# Patient Record
Sex: MALE | Race: WHITE
[De-identification: names, ages, dates, MRNs, and addresses within clinical notes are randomized per-mention and may not be internally consistent; named-entity substitution may affect disease eponyms.]

---

## 2020-08-27 ENCOUNTER — HOSPITAL ENCOUNTER (INPATIENT)
Dept: HOSPITAL 74 - JER | Age: 70
LOS: 1 days | Discharge: ANOTHER HOSPITAL | DRG: 439 | End: 2020-08-28
Attending: GENERAL ACUTE CARE HOSPITAL | Admitting: INTERNAL MEDICINE
Payer: COMMERCIAL

## 2020-08-27 VITALS — BODY MASS INDEX: 25.2 KG/M2

## 2020-08-27 DIAGNOSIS — E11.9: ICD-10-CM

## 2020-08-27 DIAGNOSIS — K86.1: ICD-10-CM

## 2020-08-27 DIAGNOSIS — N40.0: ICD-10-CM

## 2020-08-27 DIAGNOSIS — E78.5: ICD-10-CM

## 2020-08-27 DIAGNOSIS — Z85.038: ICD-10-CM

## 2020-08-27 DIAGNOSIS — Z88.1: ICD-10-CM

## 2020-08-27 DIAGNOSIS — E83.42: ICD-10-CM

## 2020-08-27 DIAGNOSIS — K85.90: Primary | ICD-10-CM

## 2020-08-27 DIAGNOSIS — N18.9: ICD-10-CM

## 2020-08-27 DIAGNOSIS — D50.9: ICD-10-CM

## 2020-08-27 DIAGNOSIS — D63.8: ICD-10-CM

## 2020-08-27 DIAGNOSIS — D64.9: ICD-10-CM

## 2020-08-27 DIAGNOSIS — E86.0: ICD-10-CM

## 2020-08-27 DIAGNOSIS — E11.22: ICD-10-CM

## 2020-08-27 DIAGNOSIS — N17.9: ICD-10-CM

## 2020-08-27 DIAGNOSIS — E46: ICD-10-CM

## 2020-08-27 DIAGNOSIS — K86.3: ICD-10-CM

## 2020-08-27 LAB
ALBUMIN SERPL-MCNC: 3.1 G/DL (ref 3.4–5)
ALP SERPL-CCNC: 160 U/L (ref 45–117)
ALT SERPL-CCNC: 49 U/L (ref 13–61)
ANION GAP SERPL CALC-SCNC: 6 MMOL/L (ref 8–16)
APTT BLD: 27.6 SECONDS (ref 25.2–36.5)
AST SERPL-CCNC: 57 U/L (ref 15–37)
BASOPHILS # BLD: 0.7 % (ref 0–2)
BILIRUB SERPL-MCNC: 0.8 MG/DL (ref 0.2–1)
BUN SERPL-MCNC: 22.5 MG/DL (ref 7–18)
CALCIUM SERPL-MCNC: 9.2 MG/DL (ref 8.5–10.1)
CHLORIDE SERPL-SCNC: 104 MMOL/L (ref 98–107)
CO2 SERPL-SCNC: 24 MMOL/L (ref 21–32)
CREAT SERPL-MCNC: 1.6 MG/DL (ref 0.55–1.3)
DEPRECATED RDW RBC AUTO: 18.7 % (ref 11.9–15.9)
EOSINOPHIL # BLD: 4.5 % (ref 0–4.5)
GLUCOSE SERPL-MCNC: 128 MG/DL (ref 74–106)
HCT VFR BLD CALC: 30.8 % (ref 35.4–49)
HGB BLD-MCNC: 9.9 GM/DL (ref 11.7–16.9)
INR BLD: 1.32 (ref 0.83–1.09)
LYMPHOCYTES # BLD: 7.3 % (ref 8–40)
MCH RBC QN AUTO: 26.8 PG (ref 25.7–33.7)
MCHC RBC AUTO-ENTMCNC: 32 G/DL (ref 32–35.9)
MCV RBC: 83.7 FL (ref 80–96)
MONOCYTES # BLD AUTO: 13.8 % (ref 3.8–10.2)
NEUTROPHILS # BLD: 73.7 % (ref 42.8–82.8)
PLATELET # BLD AUTO: 238 K/MM3 (ref 134–434)
PMV BLD: 10.2 FL (ref 7.5–11.1)
POTASSIUM SERPLBLD-SCNC: 4.9 MMOL/L (ref 3.5–5.1)
PROT SERPL-MCNC: 7.9 G/DL (ref 6.4–8.2)
PT PNL PPP: 15.6 SEC (ref 9.7–13)
RBC # BLD AUTO: 3.68 M/MM3 (ref 4–5.6)
SODIUM SERPL-SCNC: 135 MMOL/L (ref 136–145)
TRIGL SERPL-MCNC: 115 MG/DL (ref 0–150)
WBC # BLD AUTO: 7.2 K/MM3 (ref 4–10)

## 2020-08-27 PROCEDURE — U0003 INFECTIOUS AGENT DETECTION BY NUCLEIC ACID (DNA OR RNA); SEVERE ACUTE RESPIRATORY SYNDROME CORONAVIRUS 2 (SARS-COV-2) (CORONAVIRUS DISEASE [COVID-19]), AMPLIFIED PROBE TECHNIQUE, MAKING USE OF HIGH THROUGHPUT TECHNOLOGIES AS DESCRIBED BY CMS-2020-01-R: HCPCS

## 2020-08-27 RX ADMIN — SODIUM CHLORIDE, POTASSIUM CHLORIDE, SODIUM LACTATE AND CALCIUM CHLORIDE SCH MLS/HR: 600; 310; 30; 20 INJECTION, SOLUTION INTRAVENOUS at 21:23

## 2020-08-27 RX ADMIN — INSULIN ASPART SCH: 100 INJECTION, SOLUTION INTRAVENOUS; SUBCUTANEOUS at 21:39

## 2020-08-27 NOTE — HP
CHIEF COMPLAINT: 2 weeks worsening epigastric pain





PCP: Dr. Talley (patient reports last visit maybe 5 years ago; does not see 

doctor unless sick due to financial constraints)





HISTORY OF PRESENT ILLNESS:


69 year old male patient with past medical history that includes HLD, NIDDM, 

colo-rectal cancer resection with ileostomy and chemo and radiation, 

cholecystectomy, and hiatial hernia, who presented to the emergency room with 2 

weeks of worsening epigastric pain.  The patient's pain began at around a 4/10 

and then gradually increased daily until it was a 10/10 for the past 2 days and 

the patient was unable to sleep.  The pain radiates to the right and left upper 

quadrants of his abdomen.  The patient reports feeling very hungry and with an 

appetite, but he has not been eating as he feels that it might make the pain 

worse.  He has been drinking several quarts of fluids daily.  The patient denies

any nausea/vomiting.  He reports that his stool in his ileostomy bag hasn't 

changed in any way.  He changes his ileostomy bag 6 times a day.  The patient 

denies drinking any alcohol in the past month.  He stopped drinking in March of 

this year and only drinks about one 6 pack of alcohol monthly, with his last 

drink 1 month ago.  The patient denies any recent illness.  The patient reports 

that his pain is down from a 10/10 to a 4/10 with the pain medication he was 

given in the emergency room.





ER course was notable for:


(1) 2 liters Lactated Ringers


(2) 60mg Fentanyl and 1,000mg IV Tylenol


(3) CT A/P





Recent Travel: denies





PAST MEDICAL HISTORY:


HLD, NIDDM, colo-rectal cancer resection with ileostomy and chemo and radiation,

hiatial hernia





PAST SURGICAL HISTORY:


parastomal hernia repair 11/2013, I&D parastomal abscess 12/6/2013, [facility] 

laser prostatectomy 5/4/2016, right lower quadrant ileostomy 7/6/2012, colo-

rectal cancer resection 2002, cholecystectomy around 1998





Social History:


Smoking: Denies


Alcohol: Denies.  Drinks one 6-pack of beer monthly.  Last drink 1 month ago.


Drugs: Denies





Allergies





Penicillins Allergy (Severe, Verified 03/11/20 18:52)


   Difficulty Breathing


   anaphylaxis 


adhesive tape Adverse Reaction (Verified 03/10/20 05:53)


   Rash


hydromorphone HCl [From Dilaudid] Adverse Reaction (Verified 03/10/20 05:53)


   halucinations


   pt halucinates with IV dilaudid only. pt takes po dilaudid daily without any 

   reaction 








HOME MEDICATIONS:


                                Home Medications











 Medication  Instructions  Recorded


 


Gabapentin [Neurontin -] 300 mg PO TID 03/09/20


 


Pantoprazole Sodium [Protonix -] 40 mg PO DAILY 03/09/20


 


metFORMIN HCL [Metformin HCl ER] 750 mg PO DAILY 03/09/20


 


Aspirin [ASA -] 81 mg PO DAILY #30 tab.chew 03/16/20


 


Albuterol Sulfate [Proair Hfa] 2 puff IH BID PRN #1 inhaler 03/24/20


 


Metoprolol Tartrate [Lopressor -] 12.5 mg PO BID #60 tablet 03/24/20








REVIEW OF SYSTEMS


CONSTITUTIONAL: 


Absent:  no loss of appetite


CARDIOVASCULAR: 


Absent: no chest pain


RESPIRATORY: 


Absent: no shortness of breath


GASTROINTESTINAL: abdominal pan


Absent: no nausea, no vomiting, no change in his ileostomy output


GENITOURINARY: 


Absent: no dysuria


MUSCULOSKELETAL: 


Absent: no leg swelling








PHYSICAL EXAMINATION


                               Vital Signs - 24 hr











  08/27/20 08/27/20 08/27/20





  12:15 14:53 17:52


 


Temperature 98.4 F  


 


Pulse Rate 133 H  


 


Pulse Rate [  98 H 98 H





Left]   


 


Respiratory 21 H 16 16





Rate   


 


Blood Pressure 115/74  


 


Blood Pressure  135/82 130/71





[Arm]   


 


O2 Sat by Pulse 98 99 99





Oximetry (%)   














  08/27/20





  18:17


 


Temperature 


 


Pulse Rate 


 


Pulse Rate [ 





Left] 


 


Respiratory 





Rate 


 


Blood Pressure 


 


Blood Pressure 





[Arm] 


 


O2 Sat by Pulse 99





Oximetry (%) 











GENERAL: Awake, alert, and fully oriented, in no acute distress.


HEAD: Normal with no signs of trauma.


EYES: Pupils equal, round and reactive to light, extraocular movements intact. 

No lid lag.


EARS, NOSE, THROAT: Ears normal, nares patent, oropharynx clear without 

exudates. Moist mucous membranes.


NECK: Normal range of motion, supple without lymphadenopathy, JVD, or masses.


LUNGS: Breath sounds equal, clear to auscultation bilaterally. No wheezes, and 

no crackles. No accessory muscle use.


HEART: Regular rate and rhythm, normal S1 and S2 without murmur, rub or gallop.


ABDOMEN: Soft, tender to epigastric region and tender to RUQ and LUQ of abdomen,

 not distended, normoactive bowel sounds, no guarding, no rebound, no masses. 

Umbilicus dark blue-andi, but patient reports that this is it's normal 

appearance.  No erythema around ileostomy bag.


MUSCULOSKELETAL: Normal range of motion at all joints. No bony deformities or 

tenderness.


UPPER EXTREMITIES: 2+ pulses, warm, well-perfused. No cyanosis. No clubbing. No 

peripheral edema.


LOWER EXTREMITIES: 2+ pulses, warm, well-perfused. No calf tenderness. No 

peripheral edema. 


NEUROLOGICAL:  Normal speech.  Unable to see laterally to the left and 

inferiorly on visual field testing.


PSYCHIATRIC: Cooperative. Good eye contact. Appropriate mood and affect.


SKIN: Warm, dry, normal turgor, no rashes or lesions noted, normal capillary 

refill. 


                         Laboratory Results - last 24 hr











  08/27/20 08/27/20 08/27/20





  13:40 14:40 14:40


 


WBC   7.2 


 


RBC   3.68 L 


 


Hgb   9.9 L 


 


Hct   30.8 L D 


 


MCV   83.7 


 


MCH   26.8 


 


MCHC   32.0 


 


RDW   18.7 H 


 


Plt Count   238  D 


 


MPV   10.2 


 


Absolute Neuts (auto)   5.3 


 


Neutrophils %   73.7  D 


 


Lymphocytes %   7.3 L D 


 


Monocytes %   13.8 H 


 


Eosinophils %   4.5 


 


Basophils %   0.7 


 


Nucleated RBC %   0 


 


PT with INR    15.60 H


 


INR    1.32 H


 


PTT (Actin FS)    27.6


 


Sodium   


 


Potassium   


 


Chloride   


 


Carbon Dioxide   


 


Anion Gap   


 


BUN   


 


Creatinine   


 


Est GFR (CKD-EPI)AfAm   


 


Est GFR (CKD-EPI)NonAf   


 


Random Glucose   


 


Lactic Acid   


 


Calcium   


 


Total Bilirubin   


 


AST   


 


ALT   


 


Alkaline Phosphatase   


 


Total Protein   


 


Albumin   


 


Triglycerides   


 


Lipase   


 


Blood Type  O POSITIVE  


 


Antibody Screen  Negative  














  08/27/20 08/27/20 08/27/20





  14:40 14:40 18:00


 


WBC   


 


RBC   


 


Hgb   


 


Hct   


 


MCV   


 


MCH   


 


MCHC   


 


RDW   


 


Plt Count   


 


MPV   


 


Absolute Neuts (auto)   


 


Neutrophils %   


 


Lymphocytes %   


 


Monocytes %   


 


Eosinophils %   


 


Basophils %   


 


Nucleated RBC %   


 


PT with INR   


 


INR   


 


PTT (Actin FS)   


 


Sodium  135 L  


 


Potassium  4.9  


 


Chloride  104  


 


Carbon Dioxide  24  


 


Anion Gap  6 L  


 


BUN  22.5 H  


 


Creatinine  1.6 H  


 


Est GFR (CKD-EPI)AfAm  50.20  


 


Est GFR (CKD-EPI)NonAf  43.31  


 


Random Glucose  128 H  


 


Lactic Acid   2.2 H*  2.0


 


Calcium  9.2  


 


Total Bilirubin  0.8  


 


AST  57 H  


 


ALT  49  


 


Alkaline Phosphatase  160 H  


 


Total Protein  7.9  


 


Albumin  3.1 L  


 


Triglycerides  115  


 


Lipase  2732 H  


 


Blood Type   


 


Antibody Screen   











ASSESSMENT/PLAN:


69 year old male patient with past medical history that includes HLD, NIDDM, 

colo-rectal cancer resection with ileostomy and chemo and radiation, 

cholecystectomy, and hiatial hernia, who presented to the emergency room with 2 

weeks of worsening epigastric pain.





1. Pancreatitis of idiopathic etiology


- CT A/P showed pseudocysts


- Lipase 2,732


- Patient denies recent alcohol use


- Past medical history of cholecystectomy


- LR at 200


- Oxycodone CHRISTINE and Fentanyl PRN for pain


- NPO with plan to advance diet likely tomorrow morning





2. Possible WESLEY


- Nephro consulted


- Renal U/S


- IV Fluids





3. NIDDM


- Novolog Sliding Scale


- Blood Glucose Monitoring


- Consider starting on ACEI before discharge after possible WESLEY is addressed.





4. Anemia


- Hem Consulted


- Iron study done on 3/31/2020


- FOBT (patient's rectum sewn shut and instead uses ileostomy bag)


- Venofer for the morning





5. Hypoalbuminemia


- Dietician consulted





# FEN - LR at 200, Monitoring Electrolytes, NPO





DVT PPx - Lovenox SQ











Family Medical History


Family History: As Documented


Family Hx Cancer: Mother


Family Hx Coronary Artery Disease: Father





Visit type





- Medication Review


Med list reviewed for High Risk Meds patients 65 and older: Yes





- Emergency Visit


Emergency Visit: Yes


ED Registration Date: 08/27/20


Care time: The patient presented to the Emergency Department on the above date 

and was hospitalized for further evaluation of their emergent condition.





- New Patient


This patient is new to me today: Yes


Date on this admission: 08/28/20





- Critical Care


Critical Care patient: No





ATTENDING PHYSICIAN STATEMENT





I saw and evaluated the patient.


I reviewed the resident's note and discussed the case with the resident.


I agree with the resident's findings and plan as documented.








SUBJECTIVE:








OBJECTIVE:








ASSESSMENT AND PLAN:

## 2020-08-27 NOTE — PN
Teaching Attending Note


Name of Resident: Hunter Helm





ATTENDING PHYSICIAN STATEMENT





I saw and evaluated the patient.


I reviewed the resident's note and discussed the case with the resident.


I agree with the resident's findings and plan as documented.








SUBJECTIVE:


Patient is a 69 year old man with a PMH of Penicillin/Hydromorphone allergy, HLD

, NIDDM, ?Alcohol abuse, Colon cancer (s/p resection/ileostomy; 

chemotherapy/radiotherapy), Pancreatitis, ?CKD and ?COPD who presents with 

worsening epigastric pain for 2 weeks. Over the last two days, patient has had 

decreased PO intake and difficulty sleeping due to pain. Pain is described as 

epigastric, radiating bilaterally to upper abdomen, constant, 11/10, worse when 

moving and laying on right side and described as squeezing. Was prescribed 21 

tabs of Oxycodone by Dr. Talley, and states that taking 1 pill at a time had

no effect. 





States that he had Pancreatitis in March when he was admitted here (?Bactrim-

induced pancreatitis), and states that current pain is much worse. Reports 

significant weight loss prior to last admission, but denies recent further 

weight loss. No change in stool output in ileostomy bag. Patient denies chest 

pain, shortness of breath, headache, palpitations, dizziness, fever, chills, 

nausea, vomiting, diarrhea, constipation, dysuria, frequency, urgency, melena, 

hematochezia or hematuria. Former smoker. Denies alcohol, tobacco or illicit 

drug use. No sick contacts or recent travels. Family history - father  of MI

and mother  of unspecified cancer.  





OBJECTIVE:


Alert


                                   Vital Signs











 Period  Temp  Pulse  Resp  BP Sys/Kothari  Pulse Ox


 


 Last 24 Hr  98.4 F    16-21  115-135/71-82  98-99








HEENT: No Jaundice, eye redness or discharge, PERRLA, EOMI. Normocephalic, 

atraumatic. External ears are normal and hearing is grossly intact. No nasal 

discharge.


Neck: Supple, nontender. No palpable adenopathy or thyromegaly. No JVD


Chest: Good effort. Clear to auscultation and percussion.


Heart: Regular. No S3, rub or murmur


Abdomen: Not distended, soft, diffuse tenderness; ileostomy bag in place; and no

HSM. No rebound or guarding. Normal bowel sounds.


Ext: Peripheral pulses intact. No leg edema.


Skin: Warm and dry. No petechiae, rash or ecchymosis.


Neuro: Alert. Oriented x3. CN 2-12 grossly intact. Sensation grossly intact in 

all four extremities and DTR are symmetric.


Psych: Appropriate mood and affect. Good insight.


                                Home Medications











 Medication  Instructions  Recorded


 


Gabapentin [Neurontin -] 300 mg PO TID 20


 


Pantoprazole Sodium [Protonix -] 40 mg PO DAILY 20


 


metFORMIN HCL [Metformin HCl ER] 750 mg PO DAILY 20


 


Aspirin [ASA -] 81 mg PO DAILY #30 tab.chew 20


 


Albuterol Sulfate [Proair Hfa] 2 puff IH BID PRN #1 inhaler 20


 


Metoprolol Tartrate [Lopressor -] 12.5 mg PO BID #60 tablet 20








                              Abnormal Lab Results











  20





  14:40 14:40 14:40


 


RBC  3.68 L  


 


Hgb  9.9 L  


 


Hct  30.8 L D  


 


RDW  18.7 H  


 


Lymphocytes %  7.3 L D  


 


Monocytes %  13.8 H  


 


PT with INR   15.60 H 


 


INR   1.32 H 


 


Sodium    135 L


 


Anion Gap    6 L


 


BUN    22.5 H


 


Creatinine    1.6 H


 


Random Glucose    128 H


 


Lactic Acid   


 


AST    57 H


 


Alkaline Phosphatase    160 H


 


Albumin    3.1 L


 


Lipase    2732 H














  20





  14:40


 


RBC 


 


Hgb 


 


Hct 


 


RDW 


 


Lymphocytes % 


 


Monocytes % 


 


PT with INR 


 


INR 


 


Sodium 


 


Anion Gap 


 


BUN 


 


Creatinine 


 


Random Glucose 


 


Lactic Acid  2.2 H*


 


AST 


 


Alkaline Phosphatase 


 


Albumin 


 


Lipase 








                               Current Medications











Generic Name Dose Route Start Last Admin





  Trade Name Freq  PRN Reason Stop Dose Admin


 


Enoxaparin Sodium  40 mg  20 10:00 





  Lovenox -  SQ  





  DAILY CHRISTINE  


 


Fentanyl  25 mcg  20 21:18 





  Sublimaze Injection -  IVPUSH  20 21:29 





  Q6H PRN  





  PAIN LEVEL 7 - 10  


 


Lactated Ringer's  1,000 ml in 1,000 mls @ 200 mls/hr  20 21:15  20 

21:23





  Lactated Ringers Solution  IV   200 mls/hr





  ASDIR CHRISTINE   Administration


 


Insulin Aspart  1 vial  20 22:00  08/27/20 21:39





  Novolog Vial Sliding Scale -  SQ   Not Given





  [facility]  





  Protocol  


 


Oxycodone HCl  5 mg  20 22:00 





  Roxicodone -  PO  





  TID [facility]  








ASSESSMENT AND PLAN:


1. Pancreatitis - CT scan of abdomen/pelvis with IV contrast shows "multiple 

peripancreatic cysts consistent with pseudocysts, additional cysts anterior to 

transverse colon consistent with pseudocysts, thick walled urinary bladder." ER 

staff prescribed Tylenol, Fentanyl and IV LR for the patient. Will give 

Oxycodone q 6 hours and use Fentanyl for breakthrough pain. Give LR at 

200/minute. Get urinalysis stat.





EKG shows NSR at 88/minute and QTc 454, 1o AV block with no ischemic ST-T wave 

changes. Initial troponin is negative. Will avoid drugs that may prolong QTc. 

Will monitor LFTs, avoid hepatotoxic drugs, trend lactic acid and consult GI. 

Viral testing for COVID-19 ordered and patient placed on airborne, droplet and 

contact isolation. Started on supplemental oxygen via nasal cannula/non-

rebreather mask. Hyponatremia likely partly due to hyperglycemia. Will limit 

free water intake and correct hyperglycemia. Will continue comprehensive care 

for all of patients comorbid conditions.





2. DM  For now, we will hold the home diabetes drugs and implement sliding 

scale insulin regimen. Provide comprehensive diabetes care with patient teaching

and counseling about the importance of adherence to prescribed diabetes regimen,

euglycemia, eye care and foot care. Start on ACEI before discharge after ?WESLEY is

addressed.





3. Hypoalbuminemia - Possibly due to combined effects of malnutrition and 

inflammation associated with comorbid conditions. Will ensure adequate dietary 

protein intake and also consult dietician. Urinalysis pending.





4. WESLEY on ?CKD - Cause unclear. Will get kidney sonogram, hydrate gently, 

monitor urine output and consult Nephrology. Avoid nephrotoxic agents such as 

NSAIDS, aminoglycosides, contrast dyes and certain Alternative medicine 

products.





5. Anemia  Cause unclaer, but has had basic anemia work up. "Uncorrected" 

reticulocyte count was 1.45; iron saturation 12%, normal B12/folate levels with 

Hematocrit of 26.2% on 3/31/2020. She was also noted to have elevated Kappa and 

Lambda light chains and normal tumor makers (CEA, AFP, ). Will do serial 

stool guaiacs, treat with Venofer 500 mg x 2 doses and consult Hematology.





6. DVT prophylaxis - Lovenox 40 mg SQ q 24 hours.     





7. Advance directives - Full code

## 2020-08-27 NOTE — PDOC
Documentation entered by Ute May SCRIBE, acting as scribe for 

Ulises Staley MD.








Ulises Staley MD:  This documentation has been prepared by the ephraim, 

Ute May SCRIBE, under my direction and personally reviewed by me in its 

entirety.  I confirm that the documentation accurately reflects all work, 

treatment, procedures, and medical decision making performed by me.  





Attending Attestation





- Resident


Resident Name: Debby Huynh





- ED Attending Attestation


I have performed the following: I have examined & evaluated the patient, The 

case was reviewed & discussed with the resident, I agree w/resident's findings &

plan, Exceptions are as noted





- HPI


HPI: 


08/27/20 15:10


The patient is a 69 year old male with past medical history significant for HLD,

NIDDM and Colon CA s/p resection s/p ileostomy who presents to the emergency 

department with epigastric pain. The patient presents with 2 weeks of worsening 

epigastric pain radiating to the upper abdomen, associated with decreased PO 

intake. The patient reports he was prescribed Oxycodone for the pain, reports 

taking a dose, without pain relief.  Denies fever, chills, cough, chest pain, 

shortness of breath, nausea, vomiting. Denies any changes in the stool. Denies 

any other complaint. Reports this feels like what he had in March. 











- Physicial Exam


PE: 





08/27/20 15:48


Agree with resident exam 





- Medical Decision Making





08/27/20 14:48


68yo M hx pancreatitis presents to the ED with epigastric pain and decreased PO.

DEnies etoh use or new drugs


DDx includes pancreatitis vs gastritis vs GERD vs enteritis


Plan for labs, CTAP, IVF, pain control, reassess





08/27/20 15:54


Lipase in 2000s, consistent with pancreatitis 


CTAP pending


Pain well controlled





Discharge





- Follow up/Referral


Referrals: 


Cole Talley MD [Primary Care Provider] - 





- Patient Discharge Instructions





- Post Discharge Activity

## 2020-08-27 NOTE — PDOC
History of Present Illness





- General


Chief Complaint: Pain


Stated Complaint: SENT BY PCP ([facility])


Time Seen by Provider: 08/27/20 12:41





- History of Present Illness


Initial Comments: 





HPI


Pt is a 70yo M with PMH HLD, NIDDM, colon CA s/p resection s/p ileostomy, who 

presents with 2 week history of worsening epigastric pain. Over the last two 

days, patient has had decreased PO intake and difficulty sleeping due to pain. 

Pain is described as epigastric radiating bilaterally to upper abdomen, 

constant, 11/10, worse when moving and laying on right side, described as 

squeezing. Was prescribed 21 tabs of oxycodone by Dr. Talley, and states 

that taking 1 pill at a time had no effect. States that he had pancreatitis in 

March when he was admitted here (bactrim induced pancreatitis), and states that 

current pain is significant worse. Reports significant weight loss prior to last

admission, but denies recent further weight loss. Denies f/c, chest pain, SOB, 

n/v, no change in stool output in ileostomy bag, no dysuria.





PCP: [facility]


PMH: see above


PSH: see chart


Meds: see chart


Allergies: penicillin (anaphylaxis)


Social: former smoker, denies ETOH (used to drink 6 pack/day, stopped in march; 

drank 6 packs/day for 2 weeks while on vacation in July)








Review of Systems


CONSTITUTIONAL:denies fever, chills, diaphoresis, generalized weakness, 

malaise, loss of appetite


HEENT:denies rhinorrhea, nasal congestion, sore throat, ear pain, eye pain, 

visual changes


CARDIOVASCULAR:denies chest pain, syncope, palpitations, irregular heart rate, 

lightheadedness, peripheral edema


RESPIRATORY:denies cough, shortness of breath, wheezing


GASTROINTESTINAL: reports abdominal pain, denies nausea, vomiting, blood in 

ileostomy output


GENITOURINARY:denies dysuria, frequency, urgency, hesitancy, hematuria, flank 

pain


MUSCULOSKELETAL:denies myalgia, arthralgia, neck pain, back pain


HEMATOLOGIC/IMMUNOLOGIC:denies easy bleeding, easy bruising


ENDOCRINE: reports previous unexplained weight loss


NEUROLOGIC:denies headache, loss of consciousness, focal weakness or 

paresthesias, dizziness, mental status changes, bladder or bowel incontinence


SKIN:denies rash, itching, pallor





Physical Exam


General: awake, alert, fully oriented, in no acute distress, well developed, 

well nourished


Head: normocephalic, atraumatic


Eyes: PERRL, EOMI, anicteric sclera, conjunctiva clear


ENT: Auricles normal inspection, hearing grossly normal, oropharynx clear witho

ut exudates, Moist mucous membranes


Neck: supple, normal ROM


Lung: equal breath sounds b/l, CTA b/l, no crackles, wheezes; no distress, 

speaks full sentences


Heart: RRR, normal S1, S2, no murmurs appreciated


Abdomen: soft, tender to palpitation in all quadrants, normoactive bowel sounds;

voluntary guarding, no rebound, masses


Extremities: no edema, no erythema or tenderness, DP/PT pulses 2+ and symmetric


Neuro: CN2-12 grossly intact, moves all extremities, normal speech, sensation 

intact


Skin: warm, dry, no rashes or lesions noted








MDM


Pt is a 70yo M with PMH HLD, NIDDM, colon CA s/p resection s/p ileostomy, who 

presents with 2 week history of worsening epigastric pain. 





DDx including but not limited to: pancreatitis, mesenteric ischemia, peptic 

ulcer, gastritis


Workup: labs,  CT a/p


TX: pain control





ED course


Given Tylenol and 1L LR - no improvement in abdominal pain





Labs: no leukocytosis, no anemia, elevated Cr (1.6), elevated lipase (2732), 

elevated lactic acid (2.2) 





                                Laboratory Tests











  08/27/20 08/27/20 08/27/20





  13:40 14:40 14:40


 


WBC   7.2 


 


RBC   3.68 L 


 


Hgb   9.9 L 


 


Hct   30.8 L D 


 


MCV   83.7 


 


MCH   26.8 


 


MCHC   32.0 


 


RDW   18.7 H 


 


Plt Count   238  D 


 


MPV   10.2 


 


Absolute Neuts (auto)   5.3 


 


Neutrophils %   73.7  D 


 


Lymphocytes %   7.3 L D 


 


Monocytes %   13.8 H 


 


Eosinophils %   4.5 


 


Basophils %   0.7 


 


Nucleated RBC %   0 


 


PT with INR    15.60 H


 


INR    1.32 H


 


PTT (Actin FS)    27.6


 


Sodium   


 


Potassium   


 


Chloride   


 


Carbon Dioxide   


 


Anion Gap   


 


BUN   


 


Creatinine   


 


Est GFR (CKD-EPI)AfAm   


 


Est GFR (CKD-EPI)NonAf   


 


Random Glucose   


 


Lactic Acid   


 


Calcium   


 


Total Bilirubin   


 


AST   


 


ALT   


 


Alkaline Phosphatase   


 


Total Protein   


 


Albumin   


 


Lipase   


 


Blood Type  O POSITIVE  


 


Antibody Screen  Negative  














  08/27/20 08/27/20





  14:40 14:40


 


WBC  


 


RBC  


 


Hgb  


 


Hct  


 


MCV  


 


MCH  


 


MCHC  


 


RDW  


 


Plt Count  


 


MPV  


 


Absolute Neuts (auto)  


 


Neutrophils %  


 


Lymphocytes %  


 


Monocytes %  


 


Eosinophils %  


 


Basophils %  


 


Nucleated RBC %  


 


PT with INR  


 


INR  


 


PTT (Actin FS)  


 


Sodium  135 L 


 


Potassium  4.9 


 


Chloride  104 


 


Carbon Dioxide  24 


 


Anion Gap  6 L 


 


BUN  22.5 H 


 


Creatinine  1.6 H 


 


Est GFR (CKD-EPI)AfAm  50.20 


 


Est GFR (CKD-EPI)NonAf  43.31 


 


Random Glucose  128 H 


 


Lactic Acid   2.2 H*


 


Calcium  9.2 


 


Total Bilirubin  0.8 


 


AST  57 H 


 


ALT  49 


 


Alkaline Phosphatase  160 H 


 


Total Protein  7.9 


 


Albumin  3.1 L 


 


Lipase  2732 H 


 


Blood Type  


 


Antibody Screen  











CT: multiple peripancreatic cysts consistent with pseudocysts, additional cysts 

anterior to transverse colon consistent with pseudocysts, thick walled urinary 

bladder





Pt reports continued pain, will give fentanyl and additional 1L LR





Admission discussed with patient who agrees with plan


Will order EKG





Disposition: 


Admit








Past History





- Medical History


Allergies/Adverse Reactions: 


                                    Allergies











Allergy/AdvReac Type Severity Reaction Status Date / Time


 


Penicillins Allergy Severe Difficulty Verified 03/11/20 18:52





   Breathing  


 


adhesive tape AdvReac  Rash Verified 03/10/20 05:53











Home Medications: 


Ambulatory Orders





Gabapentin [Neurontin -] 300 mg PO TID 03/09/20 


Pantoprazole Sodium [Protonix -] 40 mg PO DAILY 03/09/20 


metFORMIN HCL [Metformin HCl ER] 750 mg PO DAILY 03/09/20 


Aspirin [ASA -] 81 mg PO DAILY #30 tab.chew 03/16/20 


Albuterol Sulfate [Proair Hfa] 2 puff IH BID PRN #1 inhaler 03/24/20 


Metoprolol Tartrate [Lopressor -] 12.5 mg PO BID #60 tablet 03/24/20 








Anemia: No


Asthma: No


Cancer: Yes (colo rectal 15 yrs ago 2002 s/p chemo and radiation)


Cardiac Disorders: No


CVA: No


COPD: No


CHF: No


Dementia: No


Diabetes: No


GI Disorders:  (ILEOSTOMY 2014 ; HERNIA REPAIR)


 Disorders: No


HTN: No


Hypercholesterolemia: Yes


Liver Disease: No


Seizures: No


Thyroid Disease: No





- Surgical History


Abdominal Surgery: Yes (ileostomy 7/6/12; hernia repair 11/2013; I&D 

[id number])


Appendectomy: Yes


Cardiac Surgery: No


Cholecystectomy: No


Lung Surgery: No


Neurologic Surgery: No


Orthopedic Surgery: No





- Psycho-Social/Smoking History


Smoking History: Former smoker


Have you smoked in the past 12 months: No


If you are a former smoker, when did you quit?: Over 30 years ago


Information on smoking cessation initiated: No





- Substance Abuse Hx (Audit-C & DAST Scrn)


How often the patient has a drink containing alcohol: Never


Score: In Men: 4 or > Positive; In Women: 3 or > Positive: 0


Screen Result (Pos requires Nsg. Audit-10AR): Negative


In the last yr the pt used illegal drug/Rx for NonMed reason: No


Score:  Yes response is considered Positive: 0


Screen Result (Positive result requires Nsg. DAST-10): Negative





*Physical Exam





- Vital Signs


                                Last Vital Signs











Temp Pulse Resp BP Pulse Ox


 


 98.4 F   133 H  21 H  115/74   98 


 


 08/27/20 12:15  08/27/20 12:15  08/27/20 12:15  08/27/20 12:15  08/27/20 12:15














ED Treatment Course





- LABORATORY


CBC & Chemistry Diagram: 


                                 08/28/20 09:05





                                 08/28/20 09:05





- RADIOLOGY


Radiology Studies Ordered: 














 Category Date Time Status


 


 ABDOMEN & PELVIS CT WITH CONTR [CT] Stat CT Scan  08/27/20 13:35 Ordered














Discharge





- Discharge Information


Problems reviewed: Yes


Clinical Impression/Diagnosis: 


 WESLEY (acute kidney injury)





Pancreatitis


Qualifiers:


 Chronicity: chronic Pancreatitis type: unspecified pancreatitis type Qualified 

Code(s): K86.1 - Other chronic pancreatitis





Condition: Stable





- Admission


Yes





- Follow up/Referral





- Patient Discharge Instructions





- Post Discharge Activity

## 2020-08-28 VITALS — HEART RATE: 74 BPM | TEMPERATURE: 99.2 F | SYSTOLIC BLOOD PRESSURE: 100 MMHG | DIASTOLIC BLOOD PRESSURE: 58 MMHG

## 2020-08-28 LAB
ALBUMIN SERPL-MCNC: 2.4 G/DL (ref 3.4–5)
ALP SERPL-CCNC: 117 U/L (ref 45–117)
ALT SERPL-CCNC: 38 U/L (ref 13–61)
ANION GAP SERPL CALC-SCNC: 8 MMOL/L (ref 8–16)
APPEARANCE UR: CLEAR
AST SERPL-CCNC: 40 U/L (ref 15–37)
BACTERIA # UR AUTO: 7 /UL (ref 0–1359)
BASOPHILS # BLD: 0.7 % (ref 0–2)
BILIRUB SERPL-MCNC: 1.2 MG/DL (ref 0.2–1)
BILIRUB UR STRIP.AUTO-MCNC: NEGATIVE MG/DL
BUN SERPL-MCNC: 15.5 MG/DL (ref 7–18)
CALCIUM SERPL-MCNC: 8.3 MG/DL (ref 8.5–10.1)
CASTS URNS QL MICRO: 1 /UL (ref 0–3.1)
CHLORIDE SERPL-SCNC: 107 MMOL/L (ref 98–107)
CO2 SERPL-SCNC: 24 MMOL/L (ref 21–32)
COLOR UR: YELLOW
CREAT SERPL-MCNC: 1.3 MG/DL (ref 0.55–1.3)
DEPRECATED RDW RBC AUTO: 18.7 % (ref 11.9–15.9)
EOSINOPHIL # BLD: 14.5 % (ref 0–4.5)
EPITH CASTS URNS QL MICRO: 8 /UL (ref 0–25.1)
GLUCOSE SERPL-MCNC: 84 MG/DL (ref 74–106)
HCT VFR BLD CALC: 24.8 % (ref 35.4–49)
HGB BLD-MCNC: 7.8 GM/DL (ref 11.7–16.9)
IRON SERPL-MCNC: 15 UG/DL (ref 50–175)
KETONES UR QL STRIP: NEGATIVE
LEUKOCYTE ESTERASE UR QL STRIP.AUTO: NEGATIVE
LYMPHOCYTES # BLD: 19.2 % (ref 8–40)
MAGNESIUM SERPL-MCNC: 1.7 MG/DL (ref 1.8–2.4)
MCH RBC QN AUTO: 26.2 PG (ref 25.7–33.7)
MCHC RBC AUTO-ENTMCNC: 31.3 G/DL (ref 32–35.9)
MCV RBC: 83.8 FL (ref 80–96)
MONOCYTES # BLD AUTO: 16.8 % (ref 3.8–10.2)
NEUTROPHILS # BLD: 48.8 % (ref 42.8–82.8)
NITRITE UR QL STRIP: NEGATIVE
PH UR: 5 [PH] (ref 5–8)
PHOSPHATE SERPL-MCNC: 2.8 MG/DL (ref 2.5–4.9)
PLATELET # BLD AUTO: 159 K/MM3 (ref 134–434)
PMV BLD: 9.8 FL (ref 7.5–11.1)
POTASSIUM SERPLBLD-SCNC: 4.1 MMOL/L (ref 3.5–5.1)
PROT SERPL-MCNC: 6.4 G/DL (ref 6.4–8.2)
PROT UR QL STRIP: NEGATIVE
PROT UR QL STRIP: NEGATIVE
RBC # BLD AUTO: 13 /UL (ref 0–23.9)
RBC # BLD AUTO: 2.96 M/MM3 (ref 4–5.6)
SODIUM SERPL-SCNC: 138 MMOL/L (ref 136–145)
SP GR UR: 1.07 (ref 1.01–1.03)
TIBC SERPL-MCNC: 272 UG/DL (ref 250–450)
UROBILINOGEN UR STRIP-MCNC: 0.2 MG/DL (ref 0.2–1)
WBC # BLD AUTO: 4.6 K/MM3 (ref 4–10)
WBC # UR AUTO: 14 /UL (ref 0–25.8)

## 2020-08-28 RX ADMIN — SODIUM CHLORIDE, POTASSIUM CHLORIDE, SODIUM LACTATE AND CALCIUM CHLORIDE SCH MLS/HR: 600; 310; 30; 20 INJECTION, SOLUTION INTRAVENOUS at 05:25

## 2020-08-28 RX ADMIN — INSULIN ASPART SCH: 100 INJECTION, SOLUTION INTRAVENOUS; SUBCUTANEOUS at 06:40

## 2020-08-28 RX ADMIN — SODIUM CHLORIDE, POTASSIUM CHLORIDE, SODIUM LACTATE AND CALCIUM CHLORIDE SCH MLS/HR: 600; 310; 30; 20 INJECTION, SOLUTION INTRAVENOUS at 12:47

## 2020-08-28 RX ADMIN — INSULIN ASPART SCH: 100 INJECTION, SOLUTION INTRAVENOUS; SUBCUTANEOUS at 16:42

## 2020-08-28 RX ADMIN — INSULIN ASPART SCH: 100 INJECTION, SOLUTION INTRAVENOUS; SUBCUTANEOUS at 12:17

## 2020-08-28 NOTE — CONSULT
Consultation: 


Heme-Onc Resident note





REQUESTING PROVIDER: Dr. Bravo





CONSULT REQUEST: We have been asked to medically evaluate this patient for 

anemia.





HISTORY OF PRESENT ILLNESS:


Patient is a 69 year old male with past medical history of HLD, NIDDM, St III 

colorectal cancer s/p ileostomy, presented to the hospital because of persistent

epigastric pain for 2 weeks. CT A/P revealed multiple pancreatic cysts 

consistent with pseudocyts. Of note, patient was admitted back in March for COPD

and CAD, and then developed pancreatitis. MRCP at that time was negative for any

pancreatic mass, bactrim-induced vs alcohol was suspected. Patient reports 

weight loss and decreased PO intake for the past year. He reports epigastric 

pain, but denies any fevers, chills, night sweats, nausea, vomiting, chest pain,

SOB. Denies bloody stools. We have been consulted for further evaluation of a

nemia.





Pt was diagnosed in  with colorectal cancer, with 1 positive node. He was 

found to have stage 3 disease, and underwent RT and chemo. In , he was found

to have radiation proctitis, and required ileostomy. He was following previously

with oncologist, Dr. Yin. 








PMHx: HLD, NIDDM, colon cancer s/p ileostomy


PSHx: parastomal hernia repair (), laser prostatectomy (), hemicolectomy

resection (), ileostomy (), cholecystectomy 


Allergies: PCN, hydromorphone


FHx: mother - colon CA,  at age 28. 


SHx: former smoker, quit , 1 ppd for many yrs, previous alcohol use, denies 

illicit drug use





REVIEW OF SYSTEMS:


CONSTITUTIONAL: 


Absent:  fever, chills, diaphoresis, generalized weakness, malaise, loss of 

appetite, weight change


HEENT: 


Absent:  rhinorrhea, nasal congestion, throat pain, throat swelling, difficulty 

swallowing, mouth swelling, ear pain, eye pain, visual changes


CARDIOVASCULAR: 


Absent: chest pain, syncope, palpitations, irregular heart rate, 

lightheadedness, peripheral edema


RESPIRATORY: 


Absent: cough, shortness of breath, dyspnea with exertion, orthopnea, wheezing, 

stridor, hemoptysis


GASTROINTESTINAL:abdominal pain


Absent: abdominal distension, nausea, vomiting, diarrhea, constipation, melena, 

hematochezia


GENITOURINARY: 


Absent: dysuria, frequency, urgency, hesitancy, hematuria, flank pain, genital 

pain


MUSCULOSKELETAL: 


Absent: myalgia, arthralgia, joint swelling, back pain, neck pain


SKIN: 


Absent: rash, itching, pallor


HEMATOLOGIC/IMMUNOLOGIC: 


Absent: easy bleeding, easy bruising, lymphadenopathy, frequent infections


ENDOCRINE:


Absent: unexplained weight gain, unexplained weight loss, heat intolerance, cold

intolerance


NEUROLOGIC: 


Absent: headache, focal weakness or paresthesias, dizziness, unsteady gait, 

seizure, mental status changes, bladder or bowel incontinence


PSYCHIATRIC: 


Absent: anxiety, depression, suicidal or homicidal ideation, hallucinations.





PHYSICAL EXAMINATION





                               Vital Signs - 24 hr











  20





  12:15 14:53 17:52


 


Temperature 98.4 F  


 


Pulse Rate 133 H  


 


Pulse Rate [  98 H 98 H





Left]   


 


Respiratory 21 H 16 16





Rate   


 


Blood Pressure 115/74  


 


Blood Pressure  135/82 130/71





[Arm]   


 


O2 Sat by Pulse 98 99 99





Oximetry (%)   














  20





  18:17 19:21 21:00


 


Temperature  98.8 F 


 


Pulse Rate  85 


 


Pulse Rate [   





Left]   


 


Respiratory  20 20





Rate   


 


Blood Pressure  113/62 


 


Blood Pressure   





[Arm]   


 


O2 Sat by Pulse 99 95 95





Oximetry (%)   














  20





  02:00 07:21


 


Temperature 97.7 F 97.5 F L


 


Pulse Rate 85 60


 


Pulse Rate [  





Left]  


 


Respiratory 20 20





Rate  


 


Blood Pressure 105/66 90/61


 


Blood Pressure  





[Arm]  


 


O2 Sat by Pulse 96 95





Oximetry (%)  














GENERAL: Awake, alert, and fully oriented, in no acute distress.


HEAD: Normal with no signs of trauma.


EYES: PERRLA, EOMI, sclera anicteric, conjunctiva clear. 


EARS, NOSE, THROAT: Dry mucous membranes.


NECK: Normal range of motion, supple


LUNGS: Decreased breath sounds on bilateral bases


HEART: Regular rate and rhythm, normal S1 and S2


ABDOMEN: Soft, +LUQ/epigastric tenderness, not distended, NABS, +ileostomy


LOWER EXTREMITIES: 2+ pulses, warm, well-perfused.


NEUROLOGICAL:  Cranial nerves II-XII intact. Normal speech. 


PSYCHIATRIC: Cooperative. Good eye contact. 


SKIN: Warm, dry, normal turgor











                         Laboratory Results - last 24 hr











  08/27/20 08/27/20 08/27/20





  13:40 14:40 14:40


 


WBC   7.2 


 


RBC   3.68 L 


 


Hgb   9.9 L 


 


Hct   30.8 L D 


 


MCV   83.7 


 


MCH   26.8 


 


MCHC   32.0 


 


RDW   18.7 H 


 


Plt Count   238  D 


 


MPV   10.2 


 


Absolute Neuts (auto)   5.3 


 


Neutrophils %   73.7  D 


 


Lymphocytes %   7.3 L D 


 


Monocytes %   13.8 H 


 


Eosinophils %   4.5 


 


Basophils %   0.7 


 


Nucleated RBC %   0 


 


PT with INR    15.60 H


 


INR    1.32 H


 


PTT (Actin FS)    27.6


 


Sodium   


 


Potassium   


 


Chloride   


 


Carbon Dioxide   


 


Anion Gap   


 


BUN   


 


Creatinine   


 


Est GFR (CKD-EPI)AfAm   


 


Est GFR (CKD-EPI)NonAf   


 


POC Glucometer   


 


Random Glucose   


 


Lactic Acid   


 


Calcium   


 


Phosphorus   


 


Magnesium   


 


Total Bilirubin   


 


AST   


 


ALT   


 


Alkaline Phosphatase   


 


Total Protein   


 


Albumin   


 


Triglycerides   


 


Lipase   


 


Urine Color   


 


Urine Appearance   


 


Urine pH   


 


Ur Specific Gravity   


 


Urine Protein   


 


Urine Glucose (UA)   


 


Urine Ketones   


 


Urine Blood   


 


Urine Nitrite   


 


Urine Bilirubin   


 


Urine Urobilinogen   


 


Ur Leukocyte Esterase   


 


Urine WBC (Auto)   


 


Urine RBC (Auto)   


 


Urine Casts (Auto)   


 


U Epithel Cells (Auto)   


 


Urine Bacteria (Auto)   


 


Stool Occult Blood   


 


Blood Type  O POSITIVE  


 


Antibody Screen  Negative  














  20





  14:40 14:40 18:00


 


WBC   


 


RBC   


 


Hgb   


 


Hct   


 


MCV   


 


MCH   


 


MCHC   


 


RDW   


 


Plt Count   


 


MPV   


 


Absolute Neuts (auto)   


 


Neutrophils %   


 


Lymphocytes %   


 


Monocytes %   


 


Eosinophils %   


 


Basophils %   


 


Nucleated RBC %   


 


PT with INR   


 


INR   


 


PTT (Actin FS)   


 


Sodium  135 L  


 


Potassium  4.9  


 


Chloride  104  


 


Carbon Dioxide  24  


 


Anion Gap  6 L  


 


BUN  22.5 H  


 


Creatinine  1.6 H  


 


Est GFR (CKD-EPI)AfAm  50.20  


 


Est GFR (CKD-EPI)NonAf  43.31  


 


POC Glucometer   


 


Random Glucose  128 H  


 


Lactic Acid   2.2 H*  2.0


 


Calcium  9.2  


 


Phosphorus   


 


Magnesium   


 


Total Bilirubin  0.8  


 


AST  57 H  


 


ALT  49  


 


Alkaline Phosphatase  160 H  


 


Total Protein  7.9  


 


Albumin  3.1 L  


 


Triglycerides  115  


 


Lipase  2732 H  


 


Urine Color   


 


Urine Appearance   


 


Urine pH   


 


Ur Specific Gravity   


 


Urine Protein   


 


Urine Glucose (UA)   


 


Urine Ketones   


 


Urine Blood   


 


Urine Nitrite   


 


Urine Bilirubin   


 


Urine Urobilinogen   


 


Ur Leukocyte Esterase   


 


Urine WBC (Auto)   


 


Urine RBC (Auto)   


 


Urine Casts (Auto)   


 


U Epithel Cells (Auto)   


 


Urine Bacteria (Auto)   


 


Stool Occult Blood   


 


Blood Type   


 


Antibody Screen   














  20





  21:35 23:18 05:35


 


WBC   


 


RBC   


 


Hgb   


 


Hct   


 


MCV   


 


MCH   


 


MCHC   


 


RDW   


 


Plt Count   


 


MPV   


 


Absolute Neuts (auto)   


 


Neutrophils %   


 


Lymphocytes %   


 


Monocytes %   


 


Eosinophils %   


 


Basophils %   


 


Nucleated RBC %   


 


PT with INR   


 


INR   


 


PTT (Actin FS)   


 


Sodium   


 


Potassium   


 


Chloride   


 


Carbon Dioxide   


 


Anion Gap   


 


BUN   


 


Creatinine   


 


Est GFR (CKD-EPI)AfAm   


 


Est GFR (CKD-EPI)NonAf   


 


POC Glucometer  88  


 


Random Glucose   


 


Lactic Acid   


 


Calcium   


 


Phosphorus   


 


Magnesium   


 


Total Bilirubin   


 


AST   


 


ALT   


 


Alkaline Phosphatase   


 


Total Protein   


 


Albumin   


 


Triglycerides   


 


Lipase   


 


Urine Color   Yellow 


 


Urine Appearance   Clear 


 


Urine pH   5.0  D 


 


Ur Specific Gravity   1.066 H 


 


Urine Protein   Negative 


 


Urine Glucose (UA)   Negative 


 


Urine Ketones   Negative 


 


Urine Blood   Negative 


 


Urine Nitrite   Negative 


 


Urine Bilirubin   Negative 


 


Urine Urobilinogen   0.2 


 


Ur Leukocyte Esterase   Negative 


 


Urine WBC (Auto)   14 


 


Urine RBC (Auto)   13 


 


Urine Casts (Auto)   1 


 


U Epithel Cells (Auto)   8 


 


Urine Bacteria (Auto)   7 


 


Stool Occult Blood    Positive


 


Blood Type   


 


Antibody Screen   














  20





  06:36 09:05 09:05


 


WBC   4.6 


 


RBC   2.96 L 


 


Hgb   7.8 L 


 


Hct   24.8 L D 


 


MCV   83.8 


 


MCH   26.2 


 


MCHC   31.3 L 


 


RDW   18.7 H 


 


Plt Count   159  D 


 


MPV   9.8 


 


Absolute Neuts (auto)   2.2 


 


Neutrophils %   48.8  D 


 


Lymphocytes %   19.2  D 


 


Monocytes %   16.8 H 


 


Eosinophils %   14.5 H D 


 


Basophils %   0.7 


 


Nucleated RBC %   0 


 


PT with INR   


 


INR   


 


PTT (Actin FS)   


 


Sodium    138


 


Potassium    4.1


 


Chloride    107


 


Carbon Dioxide    24


 


Anion Gap    8


 


BUN    15.5


 


Creatinine    1.3


 


Est GFR (CKD-EPI)AfAm    64.52


 


Est GFR (CKD-EPI)NonAf    55.67


 


POC Glucometer  90  


 


Random Glucose    84


 


Lactic Acid   


 


Calcium    8.3 L


 


Phosphorus    2.8


 


Magnesium    1.7 L


 


Total Bilirubin    1.2 H


 


AST    40 H


 


ALT    38


 


Alkaline Phosphatase    117


 


Total Protein    6.4


 


Albumin    2.4 L


 


Triglycerides   


 


Lipase   


 


Urine Color   


 


Urine Appearance   


 


Urine pH   


 


Ur Specific Gravity   


 


Urine Protein   


 


Urine Glucose (UA)   


 


Urine Ketones   


 


Urine Blood   


 


Urine Nitrite   


 


Urine Bilirubin   


 


Urine Urobilinogen   


 


Ur Leukocyte Esterase   


 


Urine WBC (Auto)   


 


Urine RBC (Auto)   


 


Urine Casts (Auto)   


 


U Epithel Cells (Auto)   


 


Urine Bacteria (Auto)   


 


Stool Occult Blood   


 


Blood Type   


 


Antibody Screen   








Active Medications











Generic Name Dose Route Start Last Admin





  Trade Name Freq  PRN Reason Stop Dose Admin


 


Enoxaparin Sodium  40 mg  20 10:00 





  Lovenox -  SQ  





  DAILY CHRISTINE  


 


Fentanyl  25 mcg  20 21:59  20 05:26





  Sublimaze Injection -  IVPB  20 21:29  25 mcg





  Q6H PRN   Administration





  PAIN LEVEL 7 - 10  


 


Lactated Ringer's  1,000 ml in 1,000 mls @ 200 mls/hr  20 21:15  20 

05:25





  Lactated Ringers Solution  IV   200 mls/hr





  ASDIR CHRISTINE   Administration


 


Insulin Aspart  1 vial  20 22:00  20 06:40





  Novolog Vial Sliding Scale -  SQ   Not Given





  [facility]  





  Protocol  


 


Oxycodone HCl  5 mg  20 22:00  20 06:38





  Roxicodone -  PO   5 mg





  TID CHRISTINE   Administration


 


Pantoprazole Sodium  40 mg  20 10:00 





  Protonix Iv  IVPUSH  





  DAILY CHRISTINE  











ASSESSMENT/PLAN:


Patient is a 69 year old male with past medical history of HLD, NIDDM, St III 

colorectal cancer s/p ileostomy, presented to the hospital because of persistent

 epigastric pain for 2 weeks. We have been consulted for further evaluation of 

anemia.





#Anemia


-likely 2/2 anemia of chronic disease + iron deficiency


-Iron studies on 3/2020 - iron30, TSAT 12%, consistent with iron deficiency


-Iron studies added to morning labs pending


-IV venofer given x1


-Stool occult positive


-GI on board





#Rectal CA, past history stage 3 -- s/p chemo, RT


#Radiation proctitis, s/p ileostomy


-In , pt had colorectal ca with one positive node. Stage III disease. He was

 treated with RT/Chemotherapy.


-In , radiation proctitis requiring ileostomy. Post procedure, hernia 

repair.


-CT AP: rectal thickening and increased soft tissue within presacral space -- as

 seen on previous CT


-can be due to fibrosis 2/2 radiation vs recurrent disease


-tumor markers in 3/2020, including CEA, Ca 19-9 and AFP were normal


-outpatient PET-CT scan recommended. Patient would need to follow up with PCP/ 

heme-onc outpatient. 





Patient pending transfer to [facility] for further management of pancreatic 

pseudocysts/pancreatitis.








Dispo: We will continue to follow the patient. Thank you for this consultative 

opportunity.











Visit type





- Medication Review


Med list reviewed for High Risk Meds patients 65 and older: Yes





- Emergency Visit


Emergency Visit: Yes


ED Registration Date: 20


Care time: The patient presented to the Emergency Department on the above date 

and was hospitalized for further evaluation of their emergent condition.





- New Patient


This patient is new to me today: Yes


Date on this admission: 20





- Critical Care


Critical Care patient: No





ATTENDING PHYSICIAN STATEMENT





I saw and evaluated the patient.


I reviewed the resident's note and discussed the case with the resident.


I agree with the resident's findings and plan as documented.








SUBJECTIVE:








OBJECTIVE:








ASSESSMENT AND PLAN:

## 2020-08-28 NOTE — CONSULT
Consult


Consult Specialty:: Nephrology


Reason for Consultation:: earl





- History of Present Illness


Chief Complaint: epigastric pain


History of Present Illness: 





Pt is a 69 year old male with pmhx of hld, dm, colo-rectal ca who presents to 

the ER with worsening abd pain. Pain is in the epigastric area. He was found to 

have elevated crt and I was called to evaluate him. He complains of decreased PO

intake. He has an ileostomy bag and changes it about 6 times per day. He has 

history of etoh use however he stopped in March. 





- History Source


History Provided By: Patient





- Past Medical History


Cardio/Vascular: Yes: Hyperlipdemia


Pulmonary: Yes: Bronchitis


Gastrointestinal: Yes: Cancer, Other (colo-rectal ca  treated with RT and 

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


Renal/: Yes: BPH


Infectious Disease: Yes: Other (pelvic abscess)





- Past Surgical History


Past Surgical History: Yes: Colectomy (LAR for history of anastamotic ulcer / 

fecal incontinence), Hernia Repair, Ileosotomy





- Alcohol/Substance Use


Hx Alcohol Use: Yes





- Smoking History


Smoking history: Former smoker


Have you smoked in the past 12 months: No


If you are a former smoker, when did you quit?: Over 30 years ago





- Social History


Usual Living Arrangement: With Spouse


History of Recent Travel: No





Home Medications





- Allergies


Allergies/Adverse Reactions: 


                                    Allergies











Allergy/AdvReac Type Severity Reaction Status Date / Time


 


Penicillins Allergy Severe Difficulty Verified 03/11/20 18:52





   Breathing  


 


adhesive tape AdvReac  Rash Verified 03/10/20 05:53


 


hydromorphone HCl AdvReac  halucinatio Verified 03/10/20 05:53





[From Dilaudid]   ns  














- Home Medications


Home Medications: 


Ambulatory Orders





Gabapentin [Neurontin -] 300 mg PO TID 03/09/20 


Pantoprazole Sodium [Protonix -] 40 mg PO DAILY 03/09/20 


metFORMIN HCL [Metformin HCl ER] 750 mg PO DAILY 03/09/20 


Aspirin [ASA -] 81 mg PO DAILY #30 tab.chew 03/16/20 


Albuterol Sulfate [Proair Hfa] 2 puff IH BID PRN #1 inhaler 03/24/20 


Metoprolol Tartrate [Lopressor -] 12.5 mg PO BID #60 tablet 03/24/20 











Family Medical History


Family Hx Cancer: Mother


Family Hx Coronary Artery Disease: Father





Review of Systems





- Review of Systems


Constitutional: reports: Loss of Appetite


Eyes: reports: No Symptoms


HENT: reports: No Symptoms


Neck: reports: No Symptoms


Cardiovascular: reports: No Symptoms


Respiratory: reports: No Symptoms


Gastrointestinal: reports: Abdominal Pain


Genitourinary: reports: No Symptoms


Musculoskeletal: reports: No Symptoms


Integumentary: reports: No Symptoms


Neurological: reports: No Symptoms


Endocrine: reports: No Symptoms


Hematology/Lymphatic: reports: No Symptoms


Psychiatric: reports: No Symptoms





Physical Exam


Vital Signs: 


                                   Vital Signs











Temperature  99.2 F   08/28/20 13:00


 


Pulse Rate  74   08/28/20 13:00


 


Respiratory Rate  20   08/28/20 13:00


 


Blood Pressure  100/58 L  08/28/20 13:00


 


O2 Sat by Pulse Oximetry (%)  98   08/28/20 13:00











Constitutional: Yes: Calm


Eyes: Yes: Conjunctiva Clear


HENT: Yes: Atraumatic


Neck: Yes: Supple


Cardiovascular: Yes: S1, S2


Respiratory: Yes: CTA Bilaterally


Gastrointestinal: Yes: Normal Bowel Sounds, Soft


Renal/: Yes: WNL


Musculoskeletal: Yes: WNL


Edema: No


Neurological: Yes: Oriented


Psychiatric: Yes: Oriented


Labs: 


                                    CBC, BMP





                                 08/28/20 09:05 





                                 08/28/20 09:05 











Imaging





- Results


Cat Scan: Report Reviewed





Problem List





- Problems


(1) Benign localized hyperplasia of prostate with urinary retention


Code(s): N40.1 - BENIGN PROSTATIC HYPERPLASIA WITH LOWER URINARY TRACT SYMP   





(2) Anemia


Code(s): D64.9 - ANEMIA, UNSPECIFIED   





Assessment/Plan





                               Current Medications











Generic Name Dose Route Start Last Admin





  Trade Name Leticia  PRN Reason Stop Dose Admin


 


Enoxaparin Sodium  40 mg  08/28/20 10:00  08/28/20 12:18





  Lovenox -  SQ   40 mg





  DAILY CHRISTINE   Administration


 


Lactated Ringer's  1,000 ml in 1,000 mls @ 200 mls/hr  08/27/20 21:15  08/28/20 

12:47





  Lactated Ringers Solution  IV   200 mls/hr





  ASDIR CHRISTINE   Administration


 


Insulin Aspart  1 vial  08/27/20 22:00  08/28/20 12:17





  Novolog Vial Sliding Scale -  SQ   Not Given





  ACHS CHRISTINE  





  Protocol  


 


Magnesium Sulfate  2 gm  08/28/20 14:48 





  Magnesium Sulfate  IVPB  08/28/20 14:49 





  ONCE ONE  


 


Morphine Sulfate  2 mg  08/28/20 14:52 





  Morphine Sulfate  IVPUSH  





  Q4H PRN  





  PAIN LEVEL 6-10  


 


Oxycodone HCl  5 mg  08/27/20 22:00  08/28/20 14:46





  Roxicodone -  PO   5 mg





  TID CHRISTINE   Administration


 


Pantoprazole Sodium  40 mg  08/28/20 10:00  08/28/20 12:18





  Protonix Iv  IVPUSH   40 mg





  DAILY CHRISTINE   Administration








Impression


1. earl likely from dehydration


2. hx colorectal ca


3. dm


4. hld


5. abd pain





Plan


- renal function improving


- likely dehydration


- cont fluids


- pt being transferred to [facility] for further management


- check labs in am if he is not transferred today


- replace mag

## 2020-08-28 NOTE — CON.GI
Consult


Consult Specialty:: GI


Referred by:: Hospitalist Service 


Reason for Consultation:: Pancreatitis





- History of Present Illness


Chief Complaint: Abdominal pain


History of Present Illness: 





69M admitted 3/9 for evaluation of abdominal pain. GI consulted to evaluate for 

pancreatitis. Had CT scan revealing multiple peripancreatic fluid collections, 

some of which are thick walled and large (>11cm). No vomiting. No melena or bl

ood from ileostomy. Has history of rectal cancer, s/p chemo/RT and LAR in 2002, 

s/p Ileostomy in 2012 due to persistent anastamotic ulcer and fecal 

incontinence. Episode of pancreatitis 3/20. Liver chemistries have been normal. 

Was drinking upwards of 20 beers per day.  He says that he hasn't had a drink 

since march.  When asked about the history obtained regarding having last drank 

a month ago he said "he doesn't remember."





 








- History Source


History Provided By: Patient, Medical Record





- Past Medical History


Cardio/Vascular: Yes: Hyperlipdemia


Pulmonary: Yes: Bronchitis.  No: Asthma, Cancer, COPD, O2 Dependent, Pneumonia, 

Previously Intubated, Pulmonary Embolus, Pulmonary Fibrosis, Sleep Apnea


Gastrointestinal: Yes: Cancer, Other (colo-rectal ca  treated with RT and 

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


Renal/: Yes: BPH


Infectious Disease: Yes: Other (pelvic abscess)





- Past Surgical History


Past Surgical History: Yes: Colectomy (LAR for history of anastamotic ulcer / 

fecal incontinence), Hernia Repair, Ileosotomy





- Alcohol/Substance Use


Hx Alcohol Use: Yes





- Smoking History


Smoking history: Former smoker


Have you smoked in the past 12 months: No


If you are a former smoker, when did you quit?: Over 30 years ago





- Social History


Usual Living Arrangement: With Spouse


Place of Birth: [place]


History of Recent Travel: No





Home Medications





- Allergies


Allergies/Adverse Reactions: 


                                    Allergies











Allergy/AdvReac Type Severity Reaction Status Date / Time


 


Penicillins Allergy Severe Difficulty Verified 03/11/20 18:52





   Breathing  


 


adhesive tape AdvReac  Rash Verified 03/10/20 05:53


 


hydromorphone HCl AdvReac  halucinatio Verified 03/10/20 05:53





[From Dilaudid]   ns  














- Home Medications


Home Medications: 


Ambulatory Orders





Gabapentin [Neurontin -] 300 mg PO TID 03/09/20 


Pantoprazole Sodium [Protonix -] 40 mg PO DAILY 03/09/20 


metFORMIN HCL [Metformin HCl ER] 750 mg PO DAILY 03/09/20 


Aspirin [ASA -] 81 mg PO DAILY #30 tab.chew 03/16/20 


Albuterol Sulfate [Proair Hfa] 2 puff IH BID PRN #1 inhaler 03/24/20 


Metoprolol Tartrate [Lopressor -] 12.5 mg PO BID #60 tablet 03/24/20 











Family Medical History


Family Hx Cancer: Mother


Family Hx Coronary Artery Disease: Father





Review of Systems





- Review of Systems


Constitutional: denies: Chills


Cardiovascular: denies: Chest Pain


Respiratory: denies: Cough


Gastrointestinal: reports: Abdominal Pain, Diarrhea (Chronic loose bowel 

movements though ileostomy).  denies: Constipation





Physical Exam-GI


Vital Signs: 


                                   Vital Signs











Temperature  97.5 F L  08/28/20 07:21


 


Pulse Rate  60   08/28/20 07:21


 


Respiratory Rate  20   08/28/20 07:21


 


Blood Pressure  90/61   08/28/20 07:21


 


O2 Sat by Pulse Oximetry (%)  95   08/28/20 07:21











Constitutional: Yes: Calm


Eyes: No: Sclera Icterus


Cardiovascular: Yes: Regular Rate and Rhythm.  No: Murmur


Respiratory: Yes: CTA Bilaterally


Gastrointestinal Inspection: Yes: Scars


...Auscultate: Yes: Normoactive Bowel Sounds


...Palpate: Yes: Tenderness (Marked TTP mid abdomen, left upper abdomen)


...Percussion: No: Tympanitic


Edema: No (No LE edema)


Neurological: Yes: Alert


Labs: 


                                    CBC, BMP





                                 08/28/20 09:05 





                                 08/28/20 09:05 





                                    INR, PTT











INR  1.32  (0.83-1.09)  H  08/27/20  14:40    














Imaging





- Results


Cat Scan: Report Reviewed, Image Reviewed





Assessment/Plan





Pancreatitis:


Likey acute pancreatitis with what appears to be chronic sequelae of pseudocyst 

formation.  Mr. Estrella is somewhat unclear regarding his drinking history, so 

alcohol induced pancreatitis would need to remain in the differential diagnosis.


Advise:


NPO 


IV hydration  


Monitor H/H 


Surgical evaluation.  Seen by Dr. Navarro


Prior to evaluation, it was arranged for patient to be transferred to [facility] for 

further management


Advised need for complete alcohol cessation

## 2020-08-28 NOTE — DS
Physical Exam: 


SUBJECTIVE: Patient seen and examined at bedside. pt is having abdominal pain. 








OBJECTIVE:





                                   Vital Signs











 Period  Temp  Pulse  Resp  BP Sys/Kothari  Pulse Ox


 


 Last 24 Hr  97.5 F-99.2 F  60-98  16-20  /58-82  95-99








PHYSICAL EXAM





GENERAL: The patient is awake, alert, and fully oriented, in no acute distress.


HEAD: Normal with no signs of trauma.


EYES:  extraocular movements intact, sclera anicteric


LUNGS: Breath sounds equal, clear to auscultation bilaterally, no accessory 

muscle use. 


HEART: Regular rate and rhythm, S1, S2 without murmur, rub or gallop.


ABDOMEN: Soft, TTP epigastric, RUQ,  nondistended, normoactive bowel sounds, no 

guarding,umbilical hernia. colostomy in place. 


EXTREMITIES: 2+ pulses, warm, well-perfused, no edema. 


SKIN: Warm, dry, normal turgor, no rashes or lesions noted.





LABS


                         Laboratory Results - last 24 hr











  08/27/20 08/27/20 08/27/20





  13:40 14:40 14:40


 


WBC   7.2 


 


RBC   3.68 L 


 


Hgb   9.9 L 


 


Hct   30.8 L D 


 


MCV   83.7 


 


MCH   26.8 


 


MCHC   32.0 


 


RDW   18.7 H 


 


Plt Count   238  D 


 


MPV   10.2 


 


Absolute Neuts (auto)   5.3 


 


Neutrophils %   73.7  D 


 


Lymphocytes %   7.3 L D 


 


Monocytes %   13.8 H 


 


Eosinophils %   4.5 


 


Basophils %   0.7 


 


Nucleated RBC %   0 


 


PT with INR    15.60 H


 


INR    1.32 H


 


PTT (Actin FS)    27.6


 


Sodium   


 


Potassium   


 


Chloride   


 


Carbon Dioxide   


 


Anion Gap   


 


BUN   


 


Creatinine   


 


Est GFR (CKD-EPI)AfAm   


 


Est GFR (CKD-EPI)NonAf   


 


POC Glucometer   


 


Random Glucose   


 


Lactic Acid   


 


Calcium   


 


Phosphorus   


 


Magnesium   


 


Total Bilirubin   


 


AST   


 


ALT   


 


Alkaline Phosphatase   


 


Total Protein   


 


Albumin   


 


Triglycerides   


 


Lipase   


 


Urine Color   


 


Urine Appearance   


 


Urine pH   


 


Ur Specific Gravity   


 


Urine Protein   


 


Urine Glucose (UA)   


 


Urine Ketones   


 


Urine Blood   


 


Urine Nitrite   


 


Urine Bilirubin   


 


Urine Urobilinogen   


 


Ur Leukocyte Esterase   


 


Urine WBC (Auto)   


 


Urine RBC (Auto)   


 


Urine Casts (Auto)   


 


U Epithel Cells (Auto)   


 


Urine Bacteria (Auto)   


 


Stool Occult Blood   


 


Blood Type  O POSITIVE  


 


Antibody Screen  Negative  














  08/27/20 08/27/20 08/27/20





  14:40 14:40 18:00


 


WBC   


 


RBC   


 


Hgb   


 


Hct   


 


MCV   


 


MCH   


 


MCHC   


 


RDW   


 


Plt Count   


 


MPV   


 


Absolute Neuts (auto)   


 


Neutrophils %   


 


Lymphocytes %   


 


Monocytes %   


 


Eosinophils %   


 


Basophils %   


 


Nucleated RBC %   


 


PT with INR   


 


INR   


 


PTT (Actin FS)   


 


Sodium  135 L  


 


Potassium  4.9  


 


Chloride  104  


 


Carbon Dioxide  24  


 


Anion Gap  6 L  


 


BUN  22.5 H  


 


Creatinine  1.6 H  


 


Est GFR (CKD-EPI)AfAm  50.20  


 


Est GFR (CKD-EPI)NonAf  43.31  


 


POC Glucometer   


 


Random Glucose  128 H  


 


Lactic Acid   2.2 H*  2.0


 


Calcium  9.2  


 


Phosphorus   


 


Magnesium   


 


Total Bilirubin  0.8  


 


AST  57 H  


 


ALT  49  


 


Alkaline Phosphatase  160 H  


 


Total Protein  7.9  


 


Albumin  3.1 L  


 


Triglycerides  115  


 


Lipase  2732 H  


 


Urine Color   


 


Urine Appearance   


 


Urine pH   


 


Ur Specific Gravity   


 


Urine Protein   


 


Urine Glucose (UA)   


 


Urine Ketones   


 


Urine Blood   


 


Urine Nitrite   


 


Urine Bilirubin   


 


Urine Urobilinogen   


 


Ur Leukocyte Esterase   


 


Urine WBC (Auto)   


 


Urine RBC (Auto)   


 


Urine Casts (Auto)   


 


U Epithel Cells (Auto)   


 


Urine Bacteria (Auto)   


 


Stool Occult Blood   


 


Blood Type   


 


Antibody Screen   














  08/27/20 08/27/20 08/28/20





  21:35 23:18 05:35


 


WBC   


 


RBC   


 


Hgb   


 


Hct   


 


MCV   


 


MCH   


 


MCHC   


 


RDW   


 


Plt Count   


 


MPV   


 


Absolute Neuts (auto)   


 


Neutrophils %   


 


Lymphocytes %   


 


Monocytes %   


 


Eosinophils %   


 


Basophils %   


 


Nucleated RBC %   


 


PT with INR   


 


INR   


 


PTT (Actin FS)   


 


Sodium   


 


Potassium   


 


Chloride   


 


Carbon Dioxide   


 


Anion Gap   


 


BUN   


 


Creatinine   


 


Est GFR (CKD-EPI)AfAm   


 


Est GFR (CKD-EPI)NonAf   


 


POC Glucometer  88  


 


Random Glucose   


 


Lactic Acid   


 


Calcium   


 


Phosphorus   


 


Magnesium   


 


Total Bilirubin   


 


AST   


 


ALT   


 


Alkaline Phosphatase   


 


Total Protein   


 


Albumin   


 


Triglycerides   


 


Lipase   


 


Urine Color   Yellow 


 


Urine Appearance   Clear 


 


Urine pH   5.0  D 


 


Ur Specific Gravity   1.066 H 


 


Urine Protein   Negative 


 


Urine Glucose (UA)   Negative 


 


Urine Ketones   Negative 


 


Urine Blood   Negative 


 


Urine Nitrite   Negative 


 


Urine Bilirubin   Negative 


 


Urine Urobilinogen   0.2 


 


Ur Leukocyte Esterase   Negative 


 


Urine WBC (Auto)   14 


 


Urine RBC (Auto)   13 


 


Urine Casts (Auto)   1 


 


U Epithel Cells (Auto)   8 


 


Urine Bacteria (Auto)   7 


 


Stool Occult Blood    Positive


 


Blood Type   


 


Antibody Screen   














  08/28/20 08/28/20 08/28/20





  06:36 09:05 09:05


 


WBC   4.6 


 


RBC   2.96 L 


 


Hgb   7.8 L 


 


Hct   24.8 L D 


 


MCV   83.8 


 


MCH   26.2 


 


MCHC   31.3 L 


 


RDW   18.7 H 


 


Plt Count   159  D 


 


MPV   9.8 


 


Absolute Neuts (auto)   2.2 


 


Neutrophils %   48.8  D 


 


Lymphocytes %   19.2  D 


 


Monocytes %   16.8 H 


 


Eosinophils %   14.5 H D 


 


Basophils %   0.7 


 


Nucleated RBC %   0 


 


PT with INR   


 


INR   


 


PTT (Actin FS)   


 


Sodium    138


 


Potassium    4.1


 


Chloride    107


 


Carbon Dioxide    24


 


Anion Gap    8


 


BUN    15.5


 


Creatinine    1.3


 


Est GFR (CKD-EPI)AfAm    64.52


 


Est GFR (CKD-EPI)NonAf    55.67


 


POC Glucometer  90  


 


Random Glucose    84


 


Lactic Acid   


 


Calcium    8.3 L


 


Phosphorus    2.8


 


Magnesium    1.7 L


 


Total Bilirubin    1.2 H


 


AST    40 H


 


ALT    38


 


Alkaline Phosphatase    117


 


Total Protein    6.4


 


Albumin    2.4 L


 


Triglycerides   


 


Lipase   


 


Urine Color   


 


Urine Appearance   


 


Urine pH   


 


Ur Specific Gravity   


 


Urine Protein   


 


Urine Glucose (UA)   


 


Urine Ketones   


 


Urine Blood   


 


Urine Nitrite   


 


Urine Bilirubin   


 


Urine Urobilinogen   


 


Ur Leukocyte Esterase   


 


Urine WBC (Auto)   


 


Urine RBC (Auto)   


 


Urine Casts (Auto)   


 


U Epithel Cells (Auto)   


 


Urine Bacteria (Auto)   


 


Stool Occult Blood   


 


Blood Type   


 


Antibody Screen   











HOSPITAL COURSE:





Date of Admission:08/27/20


70 yo M  patient with PMH of HLD, NIDDM, colo-rectal cancer resection with 

ileostomy and chemo and radiation, cholecystectomy, and hiatial hernia, who p/w 

2 weeks of worsening epigastric pain. Pt had CT abd/pelvis in ED showing 2 

pancreatic pseudocyst. lipase 2732. evaluated by GI and surgery. will get MRCP. 





Pt accepted to [facility] under Dr. Brent Quinteros. Pending covid. 


Date of Discharge: 08/28/20














Discharge Summary


Reason For Visit: ACUTE KIDNEY INJURY ILEOSTOMY PRESENT PACREATITIS


Condition: Guarded





- Instructions


Diet, Activity, Other Instructions: 


Transfer to [facility] transfer center Dr. brent Quinteros 


Referrals: 


Cole Talley MD [Primary Care Provider] - 


Disposition: TRANSFER ACUTE CARE/OTHER HOSP





- Home Medications


Comprehensive Discharge Medication List: 


Ambulatory Orders





Gabapentin [Neurontin -] 300 mg PO TID 03/09/20 


Pantoprazole Sodium [Protonix -] 40 mg PO DAILY 03/09/20 


metFORMIN HCL [Metformin HCl ER] 750 mg PO DAILY 03/09/20 


Aspirin [ASA -] 81 mg PO DAILY #30 tab.chew 03/16/20 


Albuterol Sulfate [Proair Hfa] 2 puff IH BID PRN #1 inhaler 03/24/20 


Metoprolol Tartrate [Lopressor -] 12.5 mg PO BID #60 tablet 03/24/20 











ATTENDING PHYSICIAN STATEMENT





I saw and evaluated the patient.


I reviewed the resident's note and discussed the case with the resident.


I agree with the resident's findings and plan as documented.








SUBJECTIVE:








OBJECTIVE:








ASSESSMENT AND PLAN:

## 2020-08-28 NOTE — PN
Teaching Attending Note


Name of Resident: Tanisha Lopes





ATTENDING PHYSICIAN STATEMENT





I saw and evaluated the patient.


I reviewed the resident's note and discussed the case with the resident.


I agree with the resident's findings and plan as documented.





69 M with hx of rectal ca s/p LAR and chemoRT in 2002 cb ileostomy in 2012 from 

persistent anastamotic ulcer and fecal incontinence presents with abdominal pain

and found to have acute pancreatitis with multiple peripancreatic fluid 

collections. Also with ETOH use.  Hb ~9 on admission, today 7.8; looks like his 

baseline is ~8 and has baseline Scr 1.5 so probably multifactorial. Hb 7.8 most 

likely dilutional from all the fluids . Patient to be transferred to [facility] 

but in the event that he is still here in the am would obtain iron 

panel/B12/folate .

## 2020-08-28 NOTE — PN
Teaching Attending Note


Name of Resident: Sherri Castellanos





ATTENDING PHYSICIAN STATEMENT





I saw and evaluated the patient.


I reviewed the resident's note and discussed the case with the resident.


I agree with the resident's findings and plan as documented.








SUBJECTIVE: Complains of ongoing abdominal pain. No nausea/vomiting. No 

fever/chills.








OBJECTIVE: Afebrile, Hemodynamically Stable. AAO x 3.





                                Last Vital Signs











Temp Pulse Resp BP Pulse Ox


 


 99.2 F   74   20   100/58 L  98 


 


 08/28/20 13:00  08/28/20 13:00  08/28/20 13:00  08/28/20 13:00  08/28/20 13:00








HEENT- Atraumatic, Normocephalic.


Heart - S1, S2, RRR


Lungs - clear to auscultation


Abdomen - Generalized tenderness worse in upper quadrants. Ileostomy RLQ with 

liquid stool.


Extremities - no edema no calf tenderness.


Neuro - AAO x 3. Tone/Power normal all 4 extremities.





                         Laboratory Results - last 24 hr











  08/27/20 08/27/20 08/27/20





  13:40 14:40 14:40


 


WBC   7.2 


 


RBC   3.68 L 


 


Hgb   9.9 L 


 


Hct   30.8 L D 


 


MCV   83.7 


 


MCH   26.8 


 


MCHC   32.0 


 


RDW   18.7 H 


 


Plt Count   238  D 


 


MPV   10.2 


 


Absolute Neuts (auto)   5.3 


 


Neutrophils %   73.7  D 


 


Lymphocytes %   7.3 L D 


 


Monocytes %   13.8 H 


 


Eosinophils %   4.5 


 


Basophils %   0.7 


 


Nucleated RBC %   0 


 


PT with INR    15.60 H


 


INR    1.32 H


 


PTT (Actin FS)    27.6


 


Sodium   


 


Potassium   


 


Chloride   


 


Carbon Dioxide   


 


Anion Gap   


 


BUN   


 


Creatinine   


 


Est GFR (CKD-EPI)AfAm   


 


Est GFR (CKD-EPI)NonAf   


 


POC Glucometer   


 


Random Glucose   


 


Lactic Acid   


 


Calcium   


 


Phosphorus   


 


Magnesium   


 


Iron   


 


TIBC   


 


Iron Saturation   


 


Unsaturated IBC   


 


Ferritin   


 


Total Bilirubin   


 


AST   


 


ALT   


 


Alkaline Phosphatase   


 


Total Protein   


 


Albumin   


 


Triglycerides   


 


Lipase   


 


Urine Color   


 


Urine Appearance   


 


Urine pH   


 


Ur Specific Gravity   


 


Urine Protein   


 


Urine Glucose (UA)   


 


Urine Ketones   


 


Urine Blood   


 


Urine Nitrite   


 


Urine Bilirubin   


 


Urine Urobilinogen   


 


Ur Leukocyte Esterase   


 


Urine WBC (Auto)   


 


Urine RBC (Auto)   


 


Urine Casts (Auto)   


 


U Epithel Cells (Auto)   


 


Urine Bacteria (Auto)   


 


Stool Occult Blood   


 


Blood Type  O POSITIVE  


 


Antibody Screen  Negative  














  08/27/20 08/27/20 08/27/20





  14:40 14:40 18:00


 


WBC   


 


RBC   


 


Hgb   


 


Hct   


 


MCV   


 


MCH   


 


MCHC   


 


RDW   


 


Plt Count   


 


MPV   


 


Absolute Neuts (auto)   


 


Neutrophils %   


 


Lymphocytes %   


 


Monocytes %   


 


Eosinophils %   


 


Basophils %   


 


Nucleated RBC %   


 


PT with INR   


 


INR   


 


PTT (Actin FS)   


 


Sodium  135 L  


 


Potassium  4.9  


 


Chloride  104  


 


Carbon Dioxide  24  


 


Anion Gap  6 L  


 


BUN  22.5 H  


 


Creatinine  1.6 H  


 


Est GFR (CKD-EPI)AfAm  50.20  


 


Est GFR (CKD-EPI)NonAf  43.31  


 


POC Glucometer   


 


Random Glucose  128 H  


 


Lactic Acid   2.2 H*  2.0


 


Calcium  9.2  


 


Phosphorus   


 


Magnesium   


 


Iron   


 


TIBC   


 


Iron Saturation   


 


Unsaturated IBC   


 


Ferritin   


 


Total Bilirubin  0.8  


 


AST  57 H  


 


ALT  49  


 


Alkaline Phosphatase  160 H  


 


Total Protein  7.9  


 


Albumin  3.1 L  


 


Triglycerides  115  


 


Lipase  2732 H  


 


Urine Color   


 


Urine Appearance   


 


Urine pH   


 


Ur Specific Gravity   


 


Urine Protein   


 


Urine Glucose (UA)   


 


Urine Ketones   


 


Urine Blood   


 


Urine Nitrite   


 


Urine Bilirubin   


 


Urine Urobilinogen   


 


Ur Leukocyte Esterase   


 


Urine WBC (Auto)   


 


Urine RBC (Auto)   


 


Urine Casts (Auto)   


 


U Epithel Cells (Auto)   


 


Urine Bacteria (Auto)   


 


Stool Occult Blood   


 


Blood Type   


 


Antibody Screen   














  08/27/20 08/27/20 08/28/20





  21:35 23:18 05:35


 


WBC   


 


RBC   


 


Hgb   


 


Hct   


 


MCV   


 


MCH   


 


MCHC   


 


RDW   


 


Plt Count   


 


MPV   


 


Absolute Neuts (auto)   


 


Neutrophils %   


 


Lymphocytes %   


 


Monocytes %   


 


Eosinophils %   


 


Basophils %   


 


Nucleated RBC %   


 


PT with INR   


 


INR   


 


PTT (Actin FS)   


 


Sodium   


 


Potassium   


 


Chloride   


 


Carbon Dioxide   


 


Anion Gap   


 


BUN   


 


Creatinine   


 


Est GFR (CKD-EPI)AfAm   


 


Est GFR (CKD-EPI)NonAf   


 


POC Glucometer  88  


 


Random Glucose   


 


Lactic Acid   


 


Calcium   


 


Phosphorus   


 


Magnesium   


 


Iron   


 


TIBC   


 


Iron Saturation   


 


Unsaturated IBC   


 


Ferritin   


 


Total Bilirubin   


 


AST   


 


ALT   


 


Alkaline Phosphatase   


 


Total Protein   


 


Albumin   


 


Triglycerides   


 


Lipase   


 


Urine Color   Yellow 


 


Urine Appearance   Clear 


 


Urine pH   5.0  D 


 


Ur Specific Gravity   1.066 H 


 


Urine Protein   Negative 


 


Urine Glucose (UA)   Negative 


 


Urine Ketones   Negative 


 


Urine Blood   Negative 


 


Urine Nitrite   Negative 


 


Urine Bilirubin   Negative 


 


Urine Urobilinogen   0.2 


 


Ur Leukocyte Esterase   Negative 


 


Urine WBC (Auto)   14 


 


Urine RBC (Auto)   13 


 


Urine Casts (Auto)   1 


 


U Epithel Cells (Auto)   8 


 


Urine Bacteria (Auto)   7 


 


Stool Occult Blood    Positive


 


Blood Type   


 


Antibody Screen   














  08/28/20 08/28/20 08/28/20





  06:36 09:05 09:05


 


WBC   4.6 


 


RBC   2.96 L 


 


Hgb   7.8 L 


 


Hct   24.8 L D 


 


MCV   83.8 


 


MCH   26.2 


 


MCHC   31.3 L 


 


RDW   18.7 H 


 


Plt Count   159  D 


 


MPV   9.8 


 


Absolute Neuts (auto)   2.2 


 


Neutrophils %   48.8  D 


 


Lymphocytes %   19.2  D 


 


Monocytes %   16.8 H 


 


Eosinophils %   14.5 H D 


 


Basophils %   0.7 


 


Nucleated RBC %   0 


 


PT with INR   


 


INR   


 


PTT (Actin FS)   


 


Sodium    138


 


Potassium    4.1


 


Chloride    107


 


Carbon Dioxide    24


 


Anion Gap    8


 


BUN    15.5


 


Creatinine    1.3


 


Est GFR (CKD-EPI)AfAm    64.52


 


Est GFR (CKD-EPI)NonAf    55.67


 


POC Glucometer  90  


 


Random Glucose    84


 


Lactic Acid   


 


Calcium    8.3 L


 


Phosphorus    2.8


 


Magnesium    1.7 L


 


Iron    15 L


 


TIBC    272


 


Iron Saturation    5 L


 


Unsaturated IBC    257


 


Ferritin    161.5


 


Total Bilirubin    1.2 H


 


AST    40 H


 


ALT    38


 


Alkaline Phosphatase    117


 


Total Protein    6.4


 


Albumin    2.4 L


 


Triglycerides   


 


Lipase   


 


Urine Color   


 


Urine Appearance   


 


Urine pH   


 


Ur Specific Gravity   


 


Urine Protein   


 


Urine Glucose (UA)   


 


Urine Ketones   


 


Urine Blood   


 


Urine Nitrite   


 


Urine Bilirubin   


 


Urine Urobilinogen   


 


Ur Leukocyte Esterase   


 


Urine WBC (Auto)   


 


Urine RBC (Auto)   


 


Urine Casts (Auto)   


 


U Epithel Cells (Auto)   


 


Urine Bacteria (Auto)   


 


Stool Occult Blood   


 


Blood Type   


 


Antibody Screen   








                               Current Medications











Generic Name Dose Route Start Last Admin





  Trade Name Freq  PRN Reason Stop Dose Admin


 


Enoxaparin Sodium  40 mg  08/28/20 10:00  08/28/20 12:18





  Lovenox -  SQ   40 mg





  DAILY CHRISTINE   Administration


 


Lactated Ringer's  1,000 ml in 1,000 mls @ 200 mls/hr  08/27/20 21:15  08/28/20 

12:47





  Lactated Ringers Solution  IV   200 mls/hr





  ASDIR CHRISTINE   Administration


 


Insulin Aspart  1 vial  08/27/20 22:00  08/28/20 12:17





  Novolog Vial Sliding Scale -  SQ   Not Given





  [facility]  





  Protocol  


 


Magnesium Sulfate  2 gm  08/28/20 14:48 





  Magnesium Sulfate  IVPB  08/28/20 14:49 





  ONCE ONE  


 


Morphine Sulfate  2 mg  08/28/20 14:52 





  Morphine Sulfate  IVPUSH  





  Q4H PRN  





  PAIN LEVEL 6-10  


 


Oxycodone HCl  5 mg  08/27/20 22:00  08/28/20 14:46





  Roxicodone -  PO   5 mg





  TID CHRISTINE   Administration


 


Pantoprazole Sodium  40 mg  08/28/20 10:00  08/28/20 12:18





  Protonix Iv  IVPUSH   40 mg





  DAILY CHRISTINE   Administration








                                Home Medications











 Medication  Instructions  Recorded


 


Gabapentin [Neurontin -] 300 mg PO TID 03/09/20


 


Pantoprazole Sodium [Protonix -] 40 mg PO DAILY 03/09/20


 


metFORMIN HCL [Metformin HCl ER] 750 mg PO DAILY 03/09/20


 


Aspirin [ASA -] 81 mg PO DAILY #30 tab.chew 03/16/20


 


Albuterol Sulfate [Proair Hfa] 2 puff IH BID PRN #1 inhaler 03/24/20


 


Metoprolol Tartrate [Lopressor -] 12.5 mg PO BID #60 tablet 03/24/20














ASSESSMENT AND PLAN:





69 year old male with history of HLD, DM 2, Hx colo-rectal cancer s/p resection 

with ileostomy, s/p CTx/RTx, s/p cholecystectomy/prostatectomy, recent bout of 

acute pancreatitis, presents with 2 week history of worsening abdominal pain, 

found to have multiple pancreatic pseudocysts on CT.


 


1. Acute Pancreatitis, recurrent, with multiple pseudocysts


Lipase 2732


NPO/IV Fluids/IV Analgesia


GI/Surgery consults





2. WESLEY - resolved with IV hydration. Renal US - L mid kidney simple cyst, no 

obstruction.





3. DM 2 - Metformin held. Maintain on Novolog sliding scale.





4. Normocytic anemia, multifactorial.


ROBBY - Iron Sat 5%, FOBT s/p (ileostomy)


s/p IV Venofer.


Hematology/Oncology consulted on admission.





5. HTN - Continue Metoprolol.





6. Hypomagnesemia - repleted.





DVT Px - Lovenox SQ.  


GI Px - PPI.

## 2020-08-28 NOTE — EKG
Test Reason : 

Blood Pressure : ***/*** mmHG

Vent. Rate : 088 BPM     Atrial Rate : 088 BPM

   P-R Int : 216 ms          QRS Dur : 078 ms

    QT Int : 376 ms       P-R-T Axes : 040 008 044 degrees

   QTc Int : 454 ms

 

SINUS RHYTHM WITH 1ST DEGREE A-V BLOCK

OTHERWISE NORMAL ECG

WHEN COMPARED WITH ECG OF 09-MAR-2020 15:08,

NO SIGNIFICANT CHANGE WAS FOUND

Confirmed by GRANT HUIZAR MD ([id number]) on 8/28/2020 11:02:56 AM

 

Referred By:             Confirmed By:GRANT HUIZAR MD

## 2020-08-28 NOTE — CONSULT
- Consultation


REQUESTING PROVIDER: Shelly MCKNIGHT





CONSULT REQUEST: We have been asked to surgically evaluate this patient for 

abdominal pain.





PCP:Orion Bravo MD





HISTORY OF PRESENT ILLNESS: CTSP who is a 70 y/o male who presneted 

w/intractable abdominal pain not relieved by oral narcotics; he has a h/o most 

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





PMHx: NIDDM/HTN/COPD/pancreatitis 03/2020         





PSHx: LAR and then ileostomy  for recurrent anastomotic leak/ulcer/incontinence 

?; open cholecystectomy;  parastomal hernia repair       


                                Home Medications











 Medication  Instructions  Recorded


 


Gabapentin [Neurontin -] 300 mg PO TID 03/09/20


 


Pantoprazole Sodium [Protonix -] 40 mg PO DAILY 03/09/20


 


metFORMIN HCL [Metformin HCl ER] 750 mg PO DAILY 03/09/20


 


Aspirin [ASA -] 81 mg PO DAILY #30 tab.chew 03/16/20


 


Albuterol Sulfate [Proair Hfa] 2 puff IH BID PRN #1 inhaler 03/24/20


 


Metoprolol Tartrate [Lopressor -] 12.5 mg PO BID #60 tablet 03/24/20








                                    Allergies











Allergy/AdvReac Type Severity Reaction Status Date / Time


 


Penicillins Allergy Severe Difficulty Verified 03/11/20 18:52





   Breathing  


 


adhesive tape AdvReac  Rash Verified 03/10/20 05:53


 


hydromorphone HCl AdvReac  halucinatio Verified 03/10/20 05:53





[From Dilaudid]   ns  








REVIEW OF SYSTEMS:


CONSTITUTIONAL: 


Absent:  fever, chills, diaphoresis, generalized weakness, malaise, loss of 

appetite, weight change


CARDIOVASCULAR: 


Absent: chest pain, syncope, palpitations, irregular heart rate, 

lightheadedness, peripheral edema


RESPIRATORY: 


Absent: cough, shortness of breath, dyspnea with exertion, wheezing, stridor, 

hemoptysis


GASTROINTESTINAL:


Present: abdominal pain, Absent: abdominal distension, nausea, vomiting, 

diarrhea, constipation, melena, hematochezia


GENITOURINARY: 


Absent: dysuria, frequency, urgency, hesitancy, hematuria, flank pain, genital 

pain


MUSCULOSKELETAL: 


Absent: myalgia, arthralgia, joint swelling, back pain, neck pain


SKIN: 


Absent: rash, itching, pallor


HEMATOLOGIC/IMMUNOLOGIC: 


Absent: easy bleeding, easy bruising, lymphadenopathy


NEUROLOGIC: 


Absent: headache, focal weakness, paresthesias, dizziness, unsteady gait, 

seizure, mental status changes, 


bladder or bowel incontinence


PSYCHIATRIC: 


Absent: anxiety, depression, suicidal or homicidal ideation, hallucinations.





PHYSICAL EXAM:


GENERAL: Awake, alert, and fully oriented, in no acute distress.


HEAD: Normal with no signs of trauma.


EYES: PERRL, sclera anicteric, conjunctiva clear.


HEART: Regular rate and rhythm. No murmurs


ABDOMEN: Soft, nontender, not distended, normoactive bowel sounds, no guarding, 

no rebound, no masses.  No organomegaly. 


MUSCULOSKELETAL: Normal ROM at all joints. No bony deformities or tenderness. No

CVA tenderness.


UPPER EXTREMITIES: 2+ pulses, warm, well-perfused. No cyanosis. Cap refill <2 

seconds. No peripheral edema.


LOWER EXTREMITIES: 2+ pulses, warm, well-perfused. No calf tenderness. No 

peripheral edema. 


NEUROLOGICAL: Normal speech, gait not observed.


PSYCH: Cooperative. Good eye contact. Appropriate mood and affect.


SKIN: Warm, dry, normal turgor, no rashes or lesions noted.


                                   Vital Signs











Temperature  97.5 F L  08/28/20 07:21


 


Pulse Rate  60   08/28/20 07:21


 


Respiratory Rate  20   08/28/20 07:21


 


Blood Pressure  90/61   08/28/20 07:21


 


O2 Sat by Pulse Oximetry (%)  95   08/28/20 07:21








                                   Lab Results











WBC  4.6 K/mm3 (4.0-10.0)   08/28/20  09:05    


 


RBC  2.96 M/mm3 (4.00-5.60)  L  08/28/20  09:05    


 


Hgb  7.8 GM/dL (11.7-16.9)  L  08/28/20  09:05    


 


Hct  24.8 % (35.4-49)  L D 08/28/20  09:05    


 


MCV  83.8 fl (80-96)   08/28/20  09:05    


 


MCHC  31.3 g/dl (32.0-35.9)  L  08/28/20  09:05    


 


RDW  18.7 % (11.9-15.9)  H  08/28/20  09:05    


 


Plt Count  159 K/MM3 (134-434)  D 08/28/20  09:05    


 


INR  1.32  (0.83-1.09)  H  08/27/20  14:40    


 


Sodium  138 mmol/L (136-145)   08/28/20  09:05    


 


Potassium  4.1 mmol/L (3.5-5.1)   08/28/20  09:05    


 


Chloride  107 mmol/L ()   08/28/20  09:05    


 


Carbon Dioxide  24 mmol/L (21-32)   08/28/20  09:05    


 


Anion Gap  8 MMOL/L (8-16)   08/28/20  09:05    


 


BUN  15.5 mg/dL (7-18)   08/28/20  09:05    


 


Creatinine  1.3 mg/dL (0.55-1.3)   08/28/20  09:05    


 


Random Glucose  84 mg/dL ()   08/28/20  09:05    


 


Calcium  8.3 mg/dL (8.5-10.1)  L  08/28/20  09:05    


 


Blood Type  O POSITIVE   08/27/20  13:40    


 


Antibody Screen  Negative   08/27/20  13:40    








CT scan a/p reviewed and c/w pancreatic pseudocysts





IMP: pancreatic pseudocysts





PLAN: Suggest evaluation by Dr. Dk Mendez at [facility]; 

in the interim suggest NPO/IVF/IV analgesics.





Duc Navarro MD FACS

## 2024-09-20 ENCOUNTER — HOSPITAL ENCOUNTER (OUTPATIENT)
Dept: HOSPITAL 74 - FER | Age: 74
Setting detail: OBSERVATION
LOS: 3 days | Discharge: HOME | End: 2024-09-23
Attending: STUDENT IN AN ORGANIZED HEALTH CARE EDUCATION/TRAINING PROGRAM | Admitting: STUDENT IN AN ORGANIZED HEALTH CARE EDUCATION/TRAINING PROGRAM
Payer: COMMERCIAL

## 2024-09-20 VITALS — BODY MASS INDEX: 21.7 KG/M2

## 2024-09-20 DIAGNOSIS — E78.5: ICD-10-CM

## 2024-09-20 DIAGNOSIS — N40.0: ICD-10-CM

## 2024-09-20 DIAGNOSIS — N13.9: Primary | ICD-10-CM

## 2024-09-20 DIAGNOSIS — Z88.0: ICD-10-CM

## 2024-09-20 DIAGNOSIS — Z87.891: ICD-10-CM

## 2024-09-20 DIAGNOSIS — Z85.038: ICD-10-CM

## 2024-09-20 DIAGNOSIS — E11.9: ICD-10-CM

## 2024-09-20 DIAGNOSIS — N17.9: ICD-10-CM

## 2024-09-20 LAB
ALBUMIN SERPL-MCNC: 3.9 G/DL (ref 3.4–5)
ALP SERPL-CCNC: 123 U/L (ref 45–117)
ALT SERPL-CCNC: 48 U/L (ref 7–52)
ANION GAP SERPL CALC-SCNC: 12 MMOL/L (ref 4–13)
APTT BLD: 28.2 SECONDS (ref 25.2–36.5)
AST SERPL-CCNC: 54 U/L (ref 15–37)
BILIRUB SERPL-MCNC: 1.1 MG/DL (ref 0.2–1)
BUN SERPL-MCNC: 16 MG/DL (ref 7–18)
CALCIUM SERPL-MCNC: 8.8 MG/DL (ref 8.5–10.1)
CHLORIDE SERPL-SCNC: 102 MMOL/L (ref 98–107)
CO2 SERPL-SCNC: 20 MMOL/L (ref 21–32)
CREAT SERPL-MCNC: 2.1 MG/DL (ref 0.6–1.3)
DEPRECATED RDW RBC AUTO: 14.5 % (ref 11.9–15.9)
GLUCOSE SERPL-MCNC: 149 MG/DL (ref 74–106)
HCT VFR BLD CALC: 34.8 % (ref 35.4–49)
HGB BLD-MCNC: 11.4 G/DL (ref 11.7–16.9)
INR BLD: 1.16 (ref 0.83–1.09)
MAGNESIUM SERPL-MCNC: 1.4 MG/DL (ref 1.8–2.4)
MCH RBC QN AUTO: 33.9 PG (ref 25.7–33.7)
MCHC RBC AUTO-ENTMCNC: 32.7 G/DL (ref 32–35.9)
MCV RBC: 103.6 FL (ref 80–96)
PHOSPHATE SERPL-MCNC: 2.8 MG/DL (ref 2.5–4.9)
PLATELET # BLD AUTO: 128.3 10^3/UL (ref 134–434)
PLATELET BLD QL SMEAR: ADEQUATE
PMV BLD: 10.2 FL (ref 7.5–11.1)
POTASSIUM SERPLBLD-SCNC: 3.5 MMOL/L (ref 3.5–5.1)
PROT SERPL-MCNC: 7.8 G/DL (ref 6.4–8.2)
PT PNL PPP: 13.2 SEC (ref 9.7–13)
RBC # BLD AUTO: 3.36 10^6/UL (ref 4–5.6)
SODIUM SERPL-SCNC: 134 MMOL/L (ref 136–145)
WBC # BLD AUTO: 4.5 10^3/UL (ref 4–10.8)

## 2024-09-20 PROCEDURE — 0T9B70Z DRAINAGE OF BLADDER WITH DRAINAGE DEVICE, VIA NATURAL OR ARTIFICIAL OPENING: ICD-10-PCS | Performed by: STUDENT IN AN ORGANIZED HEALTH CARE EDUCATION/TRAINING PROGRAM

## 2024-09-20 PROCEDURE — 3E033NZ INTRODUCTION OF ANALGESICS, HYPNOTICS, SEDATIVES INTO PERIPHERAL VEIN, PERCUTANEOUS APPROACH: ICD-10-PCS | Performed by: STUDENT IN AN ORGANIZED HEALTH CARE EDUCATION/TRAINING PROGRAM

## 2024-09-20 PROCEDURE — 3E0337Z INTRODUCTION OF ELECTROLYTIC AND WATER BALANCE SUBSTANCE INTO PERIPHERAL VEIN, PERCUTANEOUS APPROACH: ICD-10-PCS | Performed by: STUDENT IN AN ORGANIZED HEALTH CARE EDUCATION/TRAINING PROGRAM

## 2024-09-20 PROCEDURE — G0378 HOSPITAL OBSERVATION PER HR: HCPCS

## 2024-09-20 RX ADMIN — ACETAMINOPHEN PRN MG: 500 TABLET, FILM COATED ORAL at 21:10

## 2024-09-20 RX ADMIN — SODIUM CHLORIDE SCH MLS/HR: 9 INJECTION, SOLUTION INTRAVENOUS at 14:32

## 2024-09-20 RX ADMIN — GABAPENTIN SCH MG: 100 CAPSULE ORAL at 14:33

## 2024-09-20 RX ADMIN — SODIUM CHLORIDE STA MLS/HR: 9 INJECTION, SOLUTION INTRAVENOUS at 11:45

## 2024-09-20 RX ADMIN — Medication SCH: at 21:39

## 2024-09-20 RX ADMIN — ACETAMINOPHEN ONE MG: 10 INJECTION, SOLUTION INTRAVENOUS at 11:45

## 2024-09-20 RX ADMIN — INSULIN ASPART SCH: 100 INJECTION, SOLUTION INTRAVENOUS; SUBCUTANEOUS at 18:14

## 2024-09-21 LAB
ALBUMIN SERPL-MCNC: 3.3 G/DL (ref 3.4–5)
ALP SERPL-CCNC: 102 U/L (ref 45–117)
ALT SERPL-CCNC: 40 U/L (ref 7–52)
ANION GAP SERPL CALC-SCNC: 12 MMOL/L (ref 4–13)
AST SERPL-CCNC: 41 U/L (ref 15–37)
BASOPHILS # BLD: 0.2 % (ref 0–2)
BILIRUB SERPL-MCNC: 0.6 MG/DL (ref 0.2–1)
BUN SERPL-MCNC: 15 MG/DL (ref 7–18)
CALCIUM SERPL-MCNC: 8.3 MG/DL (ref 8.5–10.1)
CHLORIDE SERPL-SCNC: 106 MMOL/L (ref 98–107)
CO2 SERPL-SCNC: 20 MMOL/L (ref 21–32)
CREAT SERPL-MCNC: 1.8 MG/DL (ref 0.6–1.3)
DEPRECATED RDW RBC AUTO: 15.9 % (ref 11.9–15.9)
EOSINOPHIL # BLD: 1 % (ref 0–4.5)
GLUCOSE SERPL-MCNC: 108 MG/DL (ref 74–106)
HCT VFR BLD CALC: 29.5 % (ref 35.4–49)
HGB BLD-MCNC: 9.8 GM/DL (ref 11.7–16.9)
LYMPHOCYTES # BLD: 21.2 % (ref 8–40)
MAGNESIUM SERPL-MCNC: 1.5 MG/DL (ref 1.8–2.4)
MCH RBC QN AUTO: 34.5 PG (ref 25.7–33.7)
MCHC RBC AUTO-ENTMCNC: 33.2 G/DL (ref 32–35.9)
MCV RBC: 104.1 FL (ref 80–96)
MONOCYTES # BLD AUTO: 18 % (ref 3.8–10.2)
NEUTROPHILS # BLD: 59.6 % (ref 42.8–82.8)
PHOSPHATE SERPL-MCNC: 3.1 MG/DL (ref 2.5–4.9)
PLATELET # BLD AUTO: 106 10^3/UL (ref 134–434)
PMV BLD: 9.1 FL (ref 7.5–11.1)
POTASSIUM SERPLBLD-SCNC: 3.4 MMOL/L (ref 3.5–5.1)
PROT SERPL-MCNC: 6.6 G/DL (ref 6.4–8.2)
RBC # BLD AUTO: 2.83 M/MM3 (ref 4–5.6)
SODIUM SERPL-SCNC: 138 MMOL/L (ref 136–145)
WBC # BLD AUTO: 3.3 K/MM3 (ref 4–10)

## 2024-09-21 RX ADMIN — TAMSULOSIN HYDROCHLORIDE SCH MG: 0.4 CAPSULE ORAL at 09:12

## 2024-09-21 RX ADMIN — PANTOPRAZOLE SODIUM SCH MG: 40 TABLET, DELAYED RELEASE ORAL at 09:12

## 2024-09-21 RX ADMIN — LIDOCAINE SCH PATCH: 50 PATCH TOPICAL at 09:12

## 2024-09-21 RX ADMIN — POTASSIUM CHLORIDE ONE MEQ: 20 SOLUTION ORAL at 13:10

## 2024-09-21 RX ADMIN — MAGNESIUM SULFATE HEPTAHYDRATE ONE GM: 500 INJECTION, SOLUTION INTRAMUSCULAR; INTRAVENOUS at 13:11

## 2024-09-22 LAB
ANION GAP SERPL CALC-SCNC: 7 MMOL/L (ref 4–13)
BUN SERPL-MCNC: 12 MG/DL (ref 7–18)
CALCIUM SERPL-MCNC: 8.3 MG/DL (ref 8.5–10.1)
CHLORIDE SERPL-SCNC: 109 MMOL/L (ref 98–107)
CO2 SERPL-SCNC: 23 MMOL/L (ref 21–32)
CREAT SERPL-MCNC: 1.6 MG/DL (ref 0.6–1.3)
DEPRECATED RDW RBC AUTO: 14.4 % (ref 11.9–15.9)
GLUCOSE SERPL-MCNC: 95 MG/DL (ref 74–106)
HCT VFR BLD CALC: 29.9 % (ref 35.4–49)
HGB BLD-MCNC: 9.6 G/DL (ref 11.7–16.9)
MAGNESIUM SERPL-MCNC: 1.9 MG/DL (ref 1.8–2.4)
MCH RBC QN AUTO: 34.2 PG (ref 25.7–33.7)
MCHC RBC AUTO-ENTMCNC: 32 G/DL (ref 32–35.9)
MCV RBC: 106.7 FL (ref 80–96)
PHOSPHATE SERPL-MCNC: 2.7 MG/DL (ref 2.5–4.9)
PLATELET # BLD AUTO: 107.1 10^3/UL (ref 134–434)
PMV BLD: 9.6 FL (ref 7.5–11.1)
POTASSIUM SERPLBLD-SCNC: 4 MMOL/L (ref 3.5–5.1)
RBC # BLD AUTO: 2.8 10^6/UL (ref 4–5.6)
SODIUM SERPL-SCNC: 139 MMOL/L (ref 136–145)
WBC # BLD AUTO: 3.4 10^3/UL (ref 4–10.8)

## 2024-09-22 RX ADMIN — Medication SCH MG: at 22:08

## 2024-09-22 RX ADMIN — MULTIVITAMIN TABLET SCH TAB: TABLET at 22:08

## 2024-09-23 VITALS — HEART RATE: 97 BPM | RESPIRATION RATE: 18 BRPM | SYSTOLIC BLOOD PRESSURE: 112 MMHG | DIASTOLIC BLOOD PRESSURE: 70 MMHG

## 2024-09-23 VITALS — TEMPERATURE: 98 F

## 2024-09-23 LAB
ANION GAP SERPL CALC-SCNC: 7 MMOL/L (ref 4–13)
BUN SERPL-MCNC: 14 MG/DL (ref 7–18)
CALCIUM SERPL-MCNC: 8.3 MG/DL (ref 8.5–10.1)
CHLORIDE SERPL-SCNC: 107 MMOL/L (ref 98–107)
CO2 SERPL-SCNC: 24 MMOL/L (ref 21–32)
CREAT SERPL-MCNC: 1.7 MG/DL (ref 0.6–1.3)
DEPRECATED RDW RBC AUTO: 14.4 % (ref 11.9–15.9)
GLUCOSE SERPL-MCNC: 91 MG/DL (ref 74–106)
HCT VFR BLD CALC: 29.2 % (ref 35.4–49)
HGB BLD-MCNC: 9 G/DL (ref 11.7–16.9)
MAGNESIUM SERPL-MCNC: 1.6 MG/DL (ref 1.8–2.4)
MCH RBC QN AUTO: 33.1 PG (ref 25.7–33.7)
MCHC RBC AUTO-ENTMCNC: 31 G/DL (ref 32–35.9)
MCV RBC: 106.8 FL (ref 80–96)
PHOSPHATE SERPL-MCNC: 2.9 MG/DL (ref 2.5–4.9)
PLATELET # BLD AUTO: 111 10^3/UL (ref 134–434)
PMV BLD: 9.6 FL (ref 7.5–11.1)
POTASSIUM SERPLBLD-SCNC: 4 MMOL/L (ref 3.5–5.1)
RBC # BLD AUTO: 2.73 10^6/UL (ref 4–5.6)
SODIUM SERPL-SCNC: 138 MMOL/L (ref 136–145)
WBC # BLD AUTO: 3.6 10^3/UL (ref 4–10.8)

## 2024-10-04 ENCOUNTER — HOSPITAL ENCOUNTER (INPATIENT)
Dept: HOSPITAL 74 - JER | Age: 74
LOS: 8 days | Discharge: HOME | DRG: 394 | End: 2024-10-12
Attending: NURSE PRACTITIONER | Admitting: INTERNAL MEDICINE
Payer: COMMERCIAL

## 2024-10-04 VITALS — BODY MASS INDEX: 26.9 KG/M2

## 2024-10-04 DIAGNOSIS — K42.9: ICD-10-CM

## 2024-10-04 DIAGNOSIS — Z85.048: ICD-10-CM

## 2024-10-04 DIAGNOSIS — F32.A: ICD-10-CM

## 2024-10-04 DIAGNOSIS — K63.2: ICD-10-CM

## 2024-10-04 DIAGNOSIS — R33.8: ICD-10-CM

## 2024-10-04 DIAGNOSIS — N40.1: ICD-10-CM

## 2024-10-04 DIAGNOSIS — D64.9: ICD-10-CM

## 2024-10-04 DIAGNOSIS — R79.89: ICD-10-CM

## 2024-10-04 DIAGNOSIS — R33.9: ICD-10-CM

## 2024-10-04 DIAGNOSIS — K62.5: ICD-10-CM

## 2024-10-04 DIAGNOSIS — K62.7: Primary | ICD-10-CM

## 2024-10-04 DIAGNOSIS — Z93.2: ICD-10-CM

## 2024-10-04 DIAGNOSIS — F43.20: ICD-10-CM

## 2024-10-04 DIAGNOSIS — G62.9: ICD-10-CM

## 2024-10-04 LAB
ALBUMIN SERPL-MCNC: 3.1 G/DL (ref 3.4–5)
ALP SERPL-CCNC: 138 U/L (ref 45–117)
ALT SERPL-CCNC: 55 U/L (ref 13–61)
ANION GAP SERPL CALC-SCNC: 9 MMOL/L (ref 4–13)
APPEARANCE UR: CLEAR
AST SERPL-CCNC: 57 U/L (ref 15–37)
BASOPHILS # BLD: 0.3 % (ref 0–2)
BILIRUB SERPL-MCNC: 1.1 MG/DL (ref 0.2–1)
BILIRUB UR STRIP.AUTO-MCNC: NEGATIVE MG/DL
BUN SERPL-MCNC: 17.3 MG/DL (ref 7–18)
CALCIUM SERPL-MCNC: 9.1 MG/DL (ref 8.5–10.1)
CHLORIDE SERPL-SCNC: 106 MMOL/L (ref 98–107)
CO2 SERPL-SCNC: 23 MMOL/L (ref 21–32)
COLOR UR: YELLOW
CREAT SERPL-MCNC: 1.9 MG/DL (ref 0.55–1.3)
DEPRECATED RDW RBC AUTO: 16 % (ref 11.9–15.9)
EOSINOPHIL # BLD: 0.7 % (ref 0–4.5)
GLUCOSE SERPL-MCNC: 123 MG/DL (ref 74–106)
HCT VFR BLD CALC: 32.8 % (ref 35.4–49)
HGB BLD-MCNC: 10.9 GM/DL (ref 11.7–16.9)
INR BLD: 1.2 (ref 0.83–1.09)
KETONES UR QL STRIP: NEGATIVE
LEUKOCYTE ESTERASE UR QL STRIP.AUTO: NEGATIVE
LYMPHOCYTES # BLD: 17.5 % (ref 8–40)
MCH RBC QN AUTO: 33 PG (ref 25.7–33.7)
MCHC RBC AUTO-ENTMCNC: 33.3 G/DL (ref 32–35.9)
MCV RBC: 98.8 FL (ref 80–96)
MONOCYTES # BLD AUTO: 18.9 % (ref 3.8–10.2)
NEUTROPHILS # BLD: 62.6 % (ref 42.8–82.8)
NITRITE UR QL STRIP: NEGATIVE
PH UR: 5.5 [PH] (ref 5–8)
PLATELET # BLD AUTO: 167 10^3/UL (ref 134–434)
PMV BLD: 9.7 FL (ref 7.5–11.1)
POTASSIUM SERPLBLD-SCNC: 3.8 MMOL/L (ref 3.5–5.1)
PROT SERPL-MCNC: 8 G/DL (ref 6.4–8.2)
PROT UR QL STRIP: (no result)
PROT UR QL STRIP: NEGATIVE
PT PNL PPP: 13.7 SEC (ref 9.7–13)
RBC # BLD AUTO: 3.32 M/MM3 (ref 4–5.6)
SODIUM SERPL-SCNC: 137 MMOL/L (ref 136–145)
SP GR UR: 1.01 (ref 1.01–1.03)
UROBILINOGEN UR STRIP-MCNC: 0.2 MG/DL (ref 0.2–1)
WBC # BLD AUTO: 5.1 K/MM3 (ref 4–10)

## 2024-10-04 PROCEDURE — G0378 HOSPITAL OBSERVATION PER HR: HCPCS

## 2024-10-04 RX ADMIN — KETOROLAC TROMETHAMINE ONE MG: 15 INJECTION, SOLUTION INTRAMUSCULAR; INTRAVENOUS at 17:16

## 2024-10-04 RX ADMIN — MORPHINE SULFATE ONE MG: 2 INJECTION, SOLUTION INTRAMUSCULAR; INTRAVENOUS at 18:21

## 2024-10-05 LAB
ALBUMIN SERPL-MCNC: 2.7 G/DL (ref 3.4–5)
ALP SERPL-CCNC: 118 U/L (ref 45–117)
ALT SERPL-CCNC: 50 U/L (ref 13–61)
ANION GAP SERPL CALC-SCNC: 6 MMOL/L (ref 4–13)
ANISOCYTOSIS BLD QL: (no result)
AST SERPL-CCNC: 46 U/L (ref 15–37)
BILIRUB SERPL-MCNC: 0.5 MG/DL (ref 0.2–1)
BUN SERPL-MCNC: 18.3 MG/DL (ref 7–18)
CALCIUM SERPL-MCNC: 8.6 MG/DL (ref 8.5–10.1)
CHLORIDE SERPL-SCNC: 105 MMOL/L (ref 98–107)
CO2 SERPL-SCNC: 26 MMOL/L (ref 21–32)
CREAT SERPL-MCNC: 1.8 MG/DL (ref 0.55–1.3)
DEPRECATED RDW RBC AUTO: 15.7 % (ref 11.9–15.9)
GLUCOSE SERPL-MCNC: 132 MG/DL (ref 74–106)
HCT VFR BLD CALC: 29.2 % (ref 35.4–49)
HGB BLD-MCNC: 9.4 GM/DL (ref 11.7–16.9)
IRON SERPL-MCNC: 43 UG/DL (ref 50–175)
MACROCYTES BLD QL: (no result)
MAGNESIUM SERPL-MCNC: 1.5 MG/DL (ref 1.8–2.4)
MCH RBC QN AUTO: 32.6 PG (ref 25.7–33.7)
MCHC RBC AUTO-ENTMCNC: 32.2 G/DL (ref 32–35.9)
MCV RBC: 101.3 FL (ref 80–96)
PHOSPHATE SERPL-MCNC: 3.4 MG/DL (ref 2.5–4.9)
PLATELET # BLD AUTO: 119 10^3/UL (ref 134–434)
PMV BLD: 9.7 FL (ref 7.5–11.1)
POTASSIUM SERPLBLD-SCNC: 3.6 MMOL/L (ref 3.5–5.1)
PROT SERPL-MCNC: 7.1 G/DL (ref 6.4–8.2)
RBC # BLD AUTO: 2.88 M/MM3 (ref 4–5.6)
SODIUM SERPL-SCNC: 136 MMOL/L (ref 136–145)
TIBC SERPL-MCNC: 236 UG/DL (ref 250–450)
WBC # BLD AUTO: 3.4 K/MM3 (ref 4–10)

## 2024-10-05 RX ADMIN — ATORVASTATIN CALCIUM SCH MG: 40 TABLET, FILM COATED ORAL at 21:20

## 2024-10-05 RX ADMIN — FERROUS SULFATE TAB EC 324 MG (65 MG FE EQUIVALENT) SCH MG: 324 (65 FE) TABLET DELAYED RESPONSE at 21:20

## 2024-10-05 RX ADMIN — ACETAMINOPHEN ONE MG: 10 INJECTION, SOLUTION INTRAVENOUS at 00:58

## 2024-10-05 RX ADMIN — GABAPENTIN SCH MG: 100 CAPSULE ORAL at 06:33

## 2024-10-05 RX ADMIN — PANTOPRAZOLE SODIUM SCH MG: 40 TABLET, DELAYED RELEASE ORAL at 10:50

## 2024-10-05 RX ADMIN — CIPROFLOXACIN ONE MLS/HR: 2 INJECTION, SOLUTION INTRAVENOUS at 00:58

## 2024-10-06 LAB
ALBUMIN SERPL-MCNC: 2.7 G/DL (ref 3.4–5)
ALP SERPL-CCNC: 118 U/L (ref 45–117)
ALT SERPL-CCNC: 49 U/L (ref 13–61)
ANION GAP SERPL CALC-SCNC: 5 MMOL/L (ref 4–13)
AST SERPL-CCNC: 55 U/L (ref 15–37)
BILIRUB SERPL-MCNC: 0.4 MG/DL (ref 0.2–1)
BUN SERPL-MCNC: 19.1 MG/DL (ref 7–18)
CALCIUM SERPL-MCNC: 8.8 MG/DL (ref 8.5–10.1)
CHLORIDE SERPL-SCNC: 106 MMOL/L (ref 98–107)
CO2 SERPL-SCNC: 28 MMOL/L (ref 21–32)
CREAT SERPL-MCNC: 1.9 MG/DL (ref 0.55–1.3)
DEPRECATED RDW RBC AUTO: 15.6 % (ref 11.9–15.9)
GLUCOSE SERPL-MCNC: 99 MG/DL (ref 74–106)
HCT VFR BLD CALC: 28.1 % (ref 35.4–49)
HGB BLD-MCNC: 9.4 GM/DL (ref 11.7–16.9)
MAGNESIUM SERPL-MCNC: 1.6 MG/DL (ref 1.8–2.4)
MCH RBC QN AUTO: 33.1 PG (ref 25.7–33.7)
MCHC RBC AUTO-ENTMCNC: 33.4 G/DL (ref 32–35.9)
MCV RBC: 99.2 FL (ref 80–96)
PLATELET # BLD AUTO: 114 10^3/UL (ref 134–434)
PMV BLD: 9.4 FL (ref 7.5–11.1)
POTASSIUM SERPLBLD-SCNC: 4 MMOL/L (ref 3.5–5.1)
PROT SERPL-MCNC: 6.9 G/DL (ref 6.4–8.2)
RBC # BLD AUTO: 2.84 M/MM3 (ref 4–5.6)
SODIUM SERPL-SCNC: 139 MMOL/L (ref 136–145)
WBC # BLD AUTO: 3.7 K/MM3 (ref 4–10)

## 2024-10-06 RX ADMIN — SODIUM CHLORIDE SCH MLS/HR: 9 INJECTION, SOLUTION INTRAVENOUS at 13:46

## 2024-10-07 LAB
ALBUMIN SERPL-MCNC: 2.6 G/DL (ref 3.4–5)
ALP SERPL-CCNC: 118 U/L (ref 45–117)
ALT SERPL-CCNC: 45 U/L (ref 13–61)
ANION GAP SERPL CALC-SCNC: 5 MMOL/L (ref 4–13)
AST SERPL-CCNC: 52 U/L (ref 15–37)
BILIRUB SERPL-MCNC: 0.4 MG/DL (ref 0.2–1)
BUN SERPL-MCNC: 17.4 MG/DL (ref 7–18)
CALCIUM SERPL-MCNC: 8.5 MG/DL (ref 8.5–10.1)
CHLORIDE SERPL-SCNC: 109 MMOL/L (ref 98–107)
CO2 SERPL-SCNC: 28 MMOL/L (ref 21–32)
CREAT SERPL-MCNC: 1.7 MG/DL (ref 0.55–1.3)
DEPRECATED RDW RBC AUTO: 15.5 % (ref 11.9–15.9)
GLUCOSE SERPL-MCNC: 98 MG/DL (ref 74–106)
HCT VFR BLD CALC: 28.2 % (ref 35.4–49)
HGB BLD-MCNC: 9.2 GM/DL (ref 11.7–16.9)
MAGNESIUM SERPL-MCNC: 1.6 MG/DL (ref 1.8–2.4)
MCH RBC QN AUTO: 32.6 PG (ref 25.7–33.7)
MCHC RBC AUTO-ENTMCNC: 32.7 G/DL (ref 32–35.9)
MCV RBC: 99.9 FL (ref 80–96)
PLATELET # BLD AUTO: 111 10^3/UL (ref 134–434)
PMV BLD: 9.4 FL (ref 7.5–11.1)
POTASSIUM SERPLBLD-SCNC: 3.8 MMOL/L (ref 3.5–5.1)
PROT SERPL-MCNC: 6.8 G/DL (ref 6.4–8.2)
RBC # BLD AUTO: 2.82 M/MM3 (ref 4–5.6)
SODIUM SERPL-SCNC: 141 MMOL/L (ref 136–145)
WBC # BLD AUTO: 3.5 K/MM3 (ref 4–10)

## 2024-10-08 LAB
ALBUMIN SERPL-MCNC: 2.6 G/DL (ref 3.4–5)
ALP SERPL-CCNC: 114 U/L (ref 45–117)
ALT SERPL-CCNC: 41 U/L (ref 13–61)
ANION GAP SERPL CALC-SCNC: 5 MMOL/L (ref 4–13)
AST SERPL-CCNC: 52 U/L (ref 15–37)
BASOPHILS # BLD: 0.5 % (ref 0–2)
BILIRUB SERPL-MCNC: 0.8 MG/DL (ref 0.2–1)
BUN SERPL-MCNC: 15.4 MG/DL (ref 7–18)
CALCIUM SERPL-MCNC: 7.9 MG/DL (ref 8.5–10.1)
CHLORIDE SERPL-SCNC: 110 MMOL/L (ref 98–107)
CO2 SERPL-SCNC: 23 MMOL/L (ref 21–32)
CREAT SERPL-MCNC: 1.7 MG/DL (ref 0.55–1.3)
DEPRECATED RDW RBC AUTO: 15.7 % (ref 11.9–15.9)
EOSINOPHIL # BLD: 2.1 % (ref 0–4.5)
GGT SERPL-CCNC: 120 U/L (ref 5–85)
GLUCOSE SERPL-MCNC: 97 MG/DL (ref 74–106)
HCT VFR BLD CALC: 28.3 % (ref 35.4–49)
HGB BLD-MCNC: 9.3 GM/DL (ref 11.7–16.9)
LYMPHOCYTES # BLD: 21.1 % (ref 8–40)
MAGNESIUM SERPL-MCNC: 1.5 MG/DL (ref 1.8–2.4)
MCH RBC QN AUTO: 32.5 PG (ref 25.7–33.7)
MCHC RBC AUTO-ENTMCNC: 32.8 G/DL (ref 32–35.9)
MCV RBC: 98.9 FL (ref 80–96)
MONOCYTES # BLD AUTO: 19.3 % (ref 3.8–10.2)
NEUTROPHILS # BLD: 57 % (ref 42.8–82.8)
PLATELET # BLD AUTO: 109 10^3/UL (ref 134–434)
PMV BLD: 9.7 FL (ref 7.5–11.1)
POTASSIUM SERPLBLD-SCNC: 3.7 MMOL/L (ref 3.5–5.1)
PROT SERPL-MCNC: 6.8 G/DL (ref 6.4–8.2)
RBC # BLD AUTO: 2.86 M/MM3 (ref 4–5.6)
SODIUM SERPL-SCNC: 138 MMOL/L (ref 136–145)
WBC # BLD AUTO: 4.1 K/MM3 (ref 4–10)

## 2024-10-08 PROCEDURE — 0DJD8ZZ INSPECTION OF LOWER INTESTINAL TRACT, VIA NATURAL OR ARTIFICIAL OPENING ENDOSCOPIC: ICD-10-PCS | Performed by: INTERNAL MEDICINE

## 2024-10-08 RX ADMIN — ACETAMINOPHEN PRN MG: 10 INJECTION, SOLUTION INTRAVENOUS at 10:01

## 2024-10-09 LAB
BASOPHILS # BLD: 0.2 % (ref 0–2)
DEPRECATED RDW RBC AUTO: 15.9 % (ref 11.9–15.9)
EOSINOPHIL # BLD: 2.2 % (ref 0–4.5)
HCT VFR BLD CALC: 28.9 % (ref 35.4–49)
HGB BLD-MCNC: 9.6 GM/DL (ref 11.7–16.9)
LYMPHOCYTES # BLD: 21.2 % (ref 8–40)
MCH RBC QN AUTO: 32.9 PG (ref 25.7–33.7)
MCHC RBC AUTO-ENTMCNC: 33.4 G/DL (ref 32–35.9)
MCV RBC: 98.5 FL (ref 80–96)
MONOCYTES # BLD AUTO: 18.7 % (ref 3.8–10.2)
NEUTROPHILS # BLD: 57.7 % (ref 42.8–82.8)
PLATELET # BLD AUTO: 100 10^3/UL (ref 134–434)
PMV BLD: 9.8 FL (ref 7.5–11.1)
RBC # BLD AUTO: 2.93 M/MM3 (ref 4–5.6)
WBC # BLD AUTO: 3.1 K/MM3 (ref 4–10)

## 2024-10-09 RX ADMIN — SUCRALFATE SCH GM: 1 SUSPENSION ORAL at 21:12

## 2024-10-10 LAB
ALBUMIN SERPL-MCNC: 2.6 G/DL (ref 3.4–5)
ALP SERPL-CCNC: 106 U/L (ref 45–117)
ALT SERPL-CCNC: 32 U/L (ref 13–61)
ANION GAP SERPL CALC-SCNC: 4 MMOL/L (ref 4–13)
ANISOCYTOSIS BLD QL: 0
AST SERPL-CCNC: 40 U/L (ref 15–37)
BILIRUB SERPL-MCNC: 0.5 MG/DL (ref 0.2–1)
BUN SERPL-MCNC: 14.4 MG/DL (ref 7–18)
CALCIUM SERPL-MCNC: 8 MG/DL (ref 8.5–10.1)
CHLORIDE SERPL-SCNC: 113 MMOL/L (ref 98–107)
CO2 SERPL-SCNC: 26 MMOL/L (ref 21–32)
CREAT SERPL-MCNC: 1.6 MG/DL (ref 0.55–1.3)
DEPRECATED RDW RBC AUTO: 15.8 % (ref 11.9–15.9)
GLUCOSE SERPL-MCNC: 97 MG/DL (ref 74–106)
HCT VFR BLD CALC: 28.3 % (ref 35.4–49)
HGB BLD-MCNC: 9.6 GM/DL (ref 11.7–16.9)
MACROCYTES BLD QL: 0
MAGNESIUM SERPL-MCNC: 1.3 MG/DL (ref 1.8–2.4)
MCH RBC QN AUTO: 33.3 PG (ref 25.7–33.7)
MCHC RBC AUTO-ENTMCNC: 33.8 G/DL (ref 32–35.9)
MCV RBC: 98.4 FL (ref 80–96)
PLATELET # BLD AUTO: 104 10^3/UL (ref 134–434)
PMV BLD: 9.7 FL (ref 7.5–11.1)
POTASSIUM SERPLBLD-SCNC: 3.8 MMOL/L (ref 3.5–5.1)
PROT SERPL-MCNC: 6.5 G/DL (ref 6.4–8.2)
RBC # BLD AUTO: 2.88 M/MM3 (ref 4–5.6)
SODIUM SERPL-SCNC: 143 MMOL/L (ref 136–145)
WBC # BLD AUTO: 3.3 K/MM3 (ref 4–10)

## 2024-10-10 RX ADMIN — FENTANYL SCH PATCH: 12.5 PATCH TRANSDERMAL at 13:52

## 2024-10-10 RX ADMIN — MAGNESIUM SULFATE HEPTAHYDRATE ONE GM: 40 INJECTION, SOLUTION INTRAVENOUS at 17:18

## 2024-10-11 LAB
ALBUMIN SERPL-MCNC: 2.8 G/DL (ref 3.4–5)
ALP SERPL-CCNC: 109 U/L (ref 45–117)
ALT SERPL-CCNC: 32 U/L (ref 13–61)
ANION GAP SERPL CALC-SCNC: 6 MMOL/L (ref 4–13)
ANISOCYTOSIS BLD QL: 0
AST SERPL-CCNC: 41 U/L (ref 15–37)
BILIRUB SERPL-MCNC: 0.4 MG/DL (ref 0.2–1)
BUN SERPL-MCNC: 11.4 MG/DL (ref 7–18)
CALCIUM SERPL-MCNC: 8.4 MG/DL (ref 8.5–10.1)
CHLORIDE SERPL-SCNC: 109 MMOL/L (ref 98–107)
CO2 SERPL-SCNC: 25 MMOL/L (ref 21–32)
CREAT SERPL-MCNC: 1.7 MG/DL (ref 0.55–1.3)
DEPRECATED RDW RBC AUTO: 16 % (ref 11.9–15.9)
GLUCOSE SERPL-MCNC: 92 MG/DL (ref 74–106)
HCT VFR BLD CALC: 31.3 % (ref 35.4–49)
HGB BLD-MCNC: 10.2 GM/DL (ref 11.7–16.9)
MACROCYTES BLD QL: 0
MAGNESIUM SERPL-MCNC: 2 MG/DL (ref 1.8–2.4)
MCH RBC QN AUTO: 32.5 PG (ref 25.7–33.7)
MCHC RBC AUTO-ENTMCNC: 32.5 G/DL (ref 32–35.9)
MCV RBC: 100.1 FL (ref 80–96)
PLATELET # BLD AUTO: 113 10^3/UL (ref 134–434)
PMV BLD: 9.9 FL (ref 7.5–11.1)
POTASSIUM SERPLBLD-SCNC: 4.2 MMOL/L (ref 3.5–5.1)
PROT SERPL-MCNC: 7.1 G/DL (ref 6.4–8.2)
RBC # BLD AUTO: 3.12 M/MM3 (ref 4–5.6)
SODIUM SERPL-SCNC: 140 MMOL/L (ref 136–145)
WBC # BLD AUTO: 3.4 K/MM3 (ref 4–10)

## 2024-10-11 RX ADMIN — ACETAMINOPHEN SCH MG: 325 TABLET ORAL at 17:33

## 2024-10-11 RX ADMIN — Medication SCH: at 21:38

## 2024-10-11 RX ADMIN — LIDOCAINE SCH PATCH: 50 PATCH TOPICAL at 17:33

## 2024-10-12 VITALS
HEART RATE: 70 BPM | RESPIRATION RATE: 20 BRPM | TEMPERATURE: 98.3 F | DIASTOLIC BLOOD PRESSURE: 59 MMHG | SYSTOLIC BLOOD PRESSURE: 94 MMHG

## 2024-11-03 ENCOUNTER — HOSPITAL ENCOUNTER (EMERGENCY)
Dept: HOSPITAL 74 - JER | Age: 74
Discharge: HOME | End: 2024-11-03
Payer: COMMERCIAL

## 2024-11-03 VITALS
TEMPERATURE: 98.7 F | SYSTOLIC BLOOD PRESSURE: 114 MMHG | DIASTOLIC BLOOD PRESSURE: 77 MMHG | RESPIRATION RATE: 18 BRPM | HEART RATE: 107 BPM

## 2024-11-03 VITALS — BODY MASS INDEX: 25.7 KG/M2

## 2024-11-03 DIAGNOSIS — R33.9: ICD-10-CM

## 2024-11-03 DIAGNOSIS — T83.091A: Primary | ICD-10-CM

## 2024-12-06 ENCOUNTER — HOSPITAL ENCOUNTER (INPATIENT)
Dept: HOSPITAL 74 - JER | Age: 74
LOS: 7 days | Discharge: ANOTHER HOSPITAL | DRG: 698 | End: 2024-12-13
Attending: INTERNAL MEDICINE | Admitting: FAMILY MEDICINE
Payer: COMMERCIAL

## 2024-12-06 VITALS — BODY MASS INDEX: 24.9 KG/M2

## 2024-12-06 DIAGNOSIS — N40.0: ICD-10-CM

## 2024-12-06 DIAGNOSIS — Z85.038: ICD-10-CM

## 2024-12-06 DIAGNOSIS — N17.9: ICD-10-CM

## 2024-12-06 DIAGNOSIS — K62.7: ICD-10-CM

## 2024-12-06 DIAGNOSIS — E78.5: ICD-10-CM
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
## 2024-12-06 DIAGNOSIS — B96.1: ICD-10-CM

## 2024-12-06 DIAGNOSIS — D69.6: ICD-10-CM

## 2024-12-06 DIAGNOSIS — R79.89: ICD-10-CM

## 2024-12-06 DIAGNOSIS — T83.511A: Primary | ICD-10-CM

## 2024-12-06 DIAGNOSIS — E87.20: ICD-10-CM

## 2024-12-06 DIAGNOSIS — M62.82: ICD-10-CM

## 2024-12-06 DIAGNOSIS — N39.0: ICD-10-CM
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
## 2024-12-06 DIAGNOSIS — E11.9: ICD-10-CM

## 2024-12-06 DIAGNOSIS — N13.9: ICD-10-CM

## 2024-12-06 DIAGNOSIS — K61.1: ICD-10-CM

## 2024-12-06 DIAGNOSIS — A41.51: ICD-10-CM

## 2024-12-06 LAB
BASE EXCESS BLDV CALC-SCNC: -3.3 MMOL/L (ref -2–2)
BASOPHILS # BLD: 0.1 % (ref 0–2)
DEPRECATED RDW RBC AUTO: 15.8 % (ref 11.9–15.9)
EOSINOPHIL # BLD: 0 % (ref 0–4.5)
HCT VFR BLD CALC: 30.9 % (ref 35.4–49)
HCT VFR BLDV CALC: 39 % (ref 35.4–49)
HGB BLD-MCNC: 10.2 GM/DL (ref 11.7–16.9)
LYMPHOCYTES # BLD: 2.1 % (ref 8–40)
MCH RBC QN AUTO: 29.6 PG (ref 25.7–33.7)
MCHC RBC AUTO-ENTMCNC: 33.1 G/DL (ref 32–35.9)
MCV RBC: 89.6 FL (ref 80–96)
MONOCYTES # BLD AUTO: 12.8 % (ref 3.8–10.2)
NEUTROPHILS # BLD: 85 % (ref 42.8–82.8)
PCO2 BLDV: 35.2 MMHG (ref 38–52)
PH BLDV: 7.39 [PH] (ref 7.31–7.41)
PLATELET # BLD AUTO: 140 10^3/UL (ref 134–434)
PMV BLD: 8.6 FL (ref 7.5–11.1)
RBC # BLD AUTO: 3.45 M/MM3 (ref 4–5.6)
SAO2 % BLDV: 42.3 % (ref 70–80)
WBC # BLD AUTO: 5.1 K/MM3 (ref 4–10)

## 2024-12-06 RX ADMIN — ACETAMINOPHEN ONE MG: 10 INJECTION, SOLUTION INTRAVENOUS at 23:36

## 2024-12-06 RX ADMIN — SODIUM CHLORIDE ONE ML: 9 INJECTION, SOLUTION INTRAVENOUS at 23:36

## 2024-12-07 LAB
ALBUMIN SERPL-MCNC: 2.4 G/DL (ref 3.4–5)
ALBUMIN SERPL-MCNC: 2.8 G/DL (ref 3.4–5)
ALP SERPL-CCNC: 143 U/L (ref 45–117)
ALP SERPL-CCNC: 175 U/L (ref 45–117)
ALT SERPL-CCNC: 42 U/L (ref 13–61)
ALT SERPL-CCNC: 67 U/L (ref 13–61)
ANION GAP SERPL CALC-SCNC: 13 MMOL/L (ref 4–13)
ANION GAP SERPL CALC-SCNC: 8 MMOL/L (ref 4–13)
ANISOCYTOSIS BLD QL: 0
APPEARANCE UR: (no result)
APTT BLD: 27.9 SECONDS (ref 25.2–36.5)
AST SERPL-CCNC: 243 U/L (ref 15–37)
AST SERPL-CCNC: 58 U/L (ref 15–37)
BACTERIA # UR AUTO: (no result) /UL (ref 0–1359)
BASO STIPL BLD QL SMEAR: 0
BILIRUB SERPL-MCNC: 0.8 MG/DL (ref 0.2–1)
BILIRUB SERPL-MCNC: 1.4 MG/DL (ref 0.2–1)
BILIRUB UR STRIP.AUTO-MCNC: (no result) MG/DL
BUN SERPL-MCNC: 16.7 MG/DL (ref 7–18)
BUN SERPL-MCNC: 17.6 MG/DL (ref 7–18)
CALCIUM SERPL-MCNC: 8.3 MG/DL (ref 8.5–10.1)
CALCIUM SERPL-MCNC: 9 MG/DL (ref 8.5–10.1)
CASTS URNS QL MICRO: 9 /UL (ref 0–3.1)
CHLORIDE SERPL-SCNC: 106 MMOL/L (ref 98–107)
CHLORIDE SERPL-SCNC: 98 MMOL/L (ref 98–107)
CO2 SERPL-SCNC: 21 MMOL/L (ref 21–32)
CO2 SERPL-SCNC: 24 MMOL/L (ref 21–32)
COLOR UR: (no result)
CREAT SERPL-MCNC: 1.7 MG/DL (ref 0.55–1.3)
CREAT SERPL-MCNC: 2.2 MG/DL (ref 0.55–1.3)
CRYSTALS URNS QL MICRO: (no result) /HPF
DACRYOCYTES BLD QL SMEAR: 0
DEPRECATED RDW RBC AUTO: 16 % (ref 11.9–15.9)
DOHLE BOD BLD QL SMEAR: 0
EPITH CASTS URNS QL MICRO: >36 /UL (ref 0–25.1)
GLUCOSE SERPL-MCNC: 104 MG/DL (ref 74–106)
GLUCOSE SERPL-MCNC: 172 MG/DL (ref 74–106)
HCT VFR BLD CALC: 28 % (ref 35.4–49)
HELMET CELLS BLD QL SMEAR: 0
HGB BLD-MCNC: 9 GM/DL (ref 11.7–16.9)
HOWELL-JOLLY BOD BLD QL SMEAR: 0
INR BLD: 1.34 (ref 0.83–1.09)
KETONES UR QL STRIP: (no result)
LACTATE SERPL-MCNC: 2.3 MMOL/L (ref 0.4–2)
LACTATE SERPL-MCNC: 4.4 MMOL/L (ref 0.4–2)
LEUKOCYTE ESTERASE UR QL STRIP.AUTO: (no result)
MACROCYTES BLD QL: 0
MAGNESIUM SERPL-MCNC: 1.4 MG/DL (ref 1.8–2.4)
MCH RBC QN AUTO: 29 PG (ref 25.7–33.7)
MCHC RBC AUTO-ENTMCNC: 32.1 G/DL (ref 32–35.9)
MCV RBC: 90.2 FL (ref 80–96)
NITRITE UR QL STRIP: POSITIVE
OVALOCYTES BLD QL SMEAR: 0
PH UR: 5.5 [PH] (ref 5–8)
PHOSPHATE SERPL-MCNC: 2.3 MG/DL (ref 2.5–4.9)
PLATELET # BLD AUTO: 111 10^3/UL (ref 134–434)
PMV BLD: 8.7 FL (ref 7.5–11.1)
POTASSIUM SERPLBLD-SCNC: 3.7 MMOL/L (ref 3.5–5.1)
POTASSIUM SERPLBLD-SCNC: 3.9 MMOL/L (ref 3.5–5.1)
PROT SERPL-MCNC: 6.8 G/DL (ref 6.4–8.2)
PROT SERPL-MCNC: 7.9 G/DL (ref 6.4–8.2)
PROT UR QL STRIP: (no result)
PROT UR QL STRIP: NEGATIVE
PT PNL PPP: 15.3 SEC (ref 9.7–13)
RBC # BLD AUTO: 3.11 M/MM3 (ref 4–5.6)
RBC # BLD AUTO: 79 /UL (ref 0–23.9)
ROULEAUX BLD QL SMEAR: 0
SICKLE CELLS BLD QL SMEAR: 0
SODIUM SERPL-SCNC: 133 MMOL/L (ref 136–145)
SODIUM SERPL-SCNC: 137 MMOL/L (ref 136–145)
SP GR UR: 1.03 (ref 1.01–1.03)
TARGETS BLD QL SMEAR: 0
TOXIC GRANULES BLD QL SMEAR: 0
UROBILINOGEN UR STRIP-MCNC: 1 MG/DL (ref 0.2–1)
UUN UR-MCNC: 747 MG/DL (ref 350–1000)
WBC # BLD AUTO: 4.2 K/MM3 (ref 4–10)
WBC # UR AUTO: 7304 /UL (ref 0–25.8)
YEAST BUDDING URNS QL: (no result)

## 2024-12-07 RX ADMIN — POTASSIUM PHOSPHATE, MONOBASIC AND POTASSIUM PHOSPHATE, DIBASIC ONE MLS/HR: 224; 236 INJECTION, SOLUTION, CONCENTRATE INTRAVENOUS at 13:03

## 2024-12-07 RX ADMIN — MORPHINE SULFATE ONE MG: 2 INJECTION, SOLUTION INTRAMUSCULAR; INTRAVENOUS at 08:35

## 2024-12-07 RX ADMIN — HEPARIN SODIUM SCH UNIT: 5000 INJECTION, SOLUTION INTRAVENOUS; SUBCUTANEOUS at 13:52

## 2024-12-07 RX ADMIN — MORPHINE SULFATE PRN MG: 2 INJECTION, SOLUTION INTRAMUSCULAR; INTRAVENOUS at 14:46

## 2024-12-07 RX ADMIN — MORPHINE SULFATE ONE: 4 INJECTION, SOLUTION INTRAMUSCULAR; INTRAVENOUS at 09:33

## 2024-12-07 RX ADMIN — MAGNESIUM SULFATE HEPTAHYDRATE ONE GM: 500 INJECTION, SOLUTION INTRAMUSCULAR; INTRAVENOUS at 12:00

## 2024-12-07 RX ADMIN — SODIUM CHLORIDE ONE ML: 9 INJECTION, SOLUTION INTRAVENOUS at 03:32

## 2024-12-07 RX ADMIN — SODIUM CHLORIDE SCH MLS/HR: 9 INJECTION, SOLUTION INTRAVENOUS at 11:16

## 2024-12-07 RX ADMIN — AZTREONAM SCH MLS/HR: 1 INJECTION, POWDER, LYOPHILIZED, FOR SOLUTION INTRAMUSCULAR; INTRAVENOUS at 14:55

## 2024-12-08 LAB
ALBUMIN SERPL-MCNC: 2.2 G/DL (ref 3.4–5)
ALP SERPL-CCNC: 124 U/L (ref 45–117)
ALT SERPL-CCNC: 59 U/L (ref 13–61)
ANION GAP SERPL CALC-SCNC: 7 MMOL/L (ref 4–13)
ANISOCYTOSIS BLD QL: 0
AST SERPL-CCNC: 205 U/L (ref 15–37)
BASO STIPL BLD QL SMEAR: 0
BILIRUB SERPL-MCNC: 0.5 MG/DL (ref 0.2–1)
BUN SERPL-MCNC: 11.7 MG/DL (ref 7–18)
CALCIUM SERPL-MCNC: 7.9 MG/DL (ref 8.5–10.1)
CHLORIDE SERPL-SCNC: 106 MMOL/L (ref 98–107)
CO2 SERPL-SCNC: 22 MMOL/L (ref 21–32)
CREAT SERPL-MCNC: 1.4 MG/DL (ref 0.55–1.3)
DACRYOCYTES BLD QL SMEAR: 0
DEPRECATED RDW RBC AUTO: 16.3 % (ref 11.9–15.9)
DOHLE BOD BLD QL SMEAR: 0
GLUCOSE SERPL-MCNC: 86 MG/DL (ref 74–106)
HCT VFR BLD CALC: 26.3 % (ref 35.4–49)
HELMET CELLS BLD QL SMEAR: 0
HGB BLD-MCNC: 8.5 GM/DL (ref 11.7–16.9)
HOWELL-JOLLY BOD BLD QL SMEAR: 0
MACROCYTES BLD QL: 0
MAGNESIUM SERPL-MCNC: 1.7 MG/DL (ref 1.8–2.4)
MCH RBC QN AUTO: 28.8 PG (ref 25.7–33.7)
MCHC RBC AUTO-ENTMCNC: 32.2 G/DL (ref 32–35.9)
MCV RBC: 89.4 FL (ref 80–96)
OVALOCYTES BLD QL SMEAR: 0
PHOSPHATE SERPL-MCNC: 3 MG/DL (ref 2.5–4.9)
PLATELET # BLD AUTO: 121 10^3/UL (ref 134–434)
PMV BLD: 8.8 FL (ref 7.5–11.1)
POTASSIUM SERPLBLD-SCNC: 3.6 MMOL/L (ref 3.5–5.1)
PROT SERPL-MCNC: 6.4 G/DL (ref 6.4–8.2)
RBC # BLD AUTO: 2.94 M/MM3 (ref 4–5.6)
ROULEAUX BLD QL SMEAR: 0
SICKLE CELLS BLD QL SMEAR: 0
SODIUM SERPL-SCNC: 136 MMOL/L (ref 136–145)
TARGETS BLD QL SMEAR: 0
TOXIC GRANULES BLD QL SMEAR: 0
WBC # BLD AUTO: 4.4 K/MM3 (ref 4–10)

## 2024-12-08 RX ADMIN — SODIUM CHLORIDE SCH MLS/HR: 9 INJECTION, SOLUTION INTRAVENOUS at 15:17

## 2024-12-09 LAB
ALBUMIN SERPL-MCNC: 2.1 G/DL (ref 3.4–5)
ALP SERPL-CCNC: 114 U/L (ref 45–117)
ALT SERPL-CCNC: 51 U/L (ref 13–61)
ANION GAP SERPL CALC-SCNC: 6 MMOL/L (ref 4–13)
AST SERPL-CCNC: 148 U/L (ref 15–37)
BILIRUB SERPL-MCNC: 0.4 MG/DL (ref 0.2–1)
BUN SERPL-MCNC: 12.7 MG/DL (ref 7–18)
CALCIUM SERPL-MCNC: 7.9 MG/DL (ref 8.5–10.1)
CHLORIDE SERPL-SCNC: 110 MMOL/L (ref 98–107)
CO2 SERPL-SCNC: 24 MMOL/L (ref 21–32)
CREAT SERPL-MCNC: 1.3 MG/DL (ref 0.55–1.3)
GLUCOSE SERPL-MCNC: 88 MG/DL (ref 74–106)
POTASSIUM SERPLBLD-SCNC: 3.5 MMOL/L (ref 3.5–5.1)
PROT SERPL-MCNC: 6 G/DL (ref 6.4–8.2)
SODIUM SERPL-SCNC: 139 MMOL/L (ref 136–145)

## 2024-12-09 RX ADMIN — PANTOPRAZOLE SODIUM SCH MG: 40 TABLET, DELAYED RELEASE ORAL at 09:39

## 2024-12-09 RX ADMIN — SUCRALFATE SCH: 1 SUSPENSION ORAL at 23:37

## 2024-12-10 LAB
ALBUMIN SERPL-MCNC: 2.2 G/DL (ref 3.4–5)
ALP SERPL-CCNC: 114 U/L (ref 45–117)
ALT SERPL-CCNC: 43 U/L (ref 13–61)
ANION GAP SERPL CALC-SCNC: 7 MMOL/L (ref 4–13)
AST SERPL-CCNC: 112 U/L (ref 15–37)
BASOPHILS # BLD: 0.3 % (ref 0–2)
BILIRUB SERPL-MCNC: 0.4 MG/DL (ref 0.2–1)
BUN SERPL-MCNC: 11.3 MG/DL (ref 7–18)
CALCIUM SERPL-MCNC: 8 MG/DL (ref 8.5–10.1)
CHLORIDE SERPL-SCNC: 111 MMOL/L (ref 98–107)
CO2 SERPL-SCNC: 24 MMOL/L (ref 21–32)
CREAT SERPL-MCNC: 1.3 MG/DL (ref 0.55–1.3)
DEPRECATED RDW RBC AUTO: 16.3 % (ref 11.9–15.9)
EOSINOPHIL # BLD: 0.7 % (ref 0–4.5)
GLUCOSE SERPL-MCNC: 87 MG/DL (ref 74–106)
HCT VFR BLD CALC: 27.5 % (ref 35.4–49)
HGB BLD-MCNC: 8.8 GM/DL (ref 11.7–16.9)
LYMPHOCYTES # BLD: 21.7 % (ref 8–40)
MAGNESIUM SERPL-MCNC: 1.6 MG/DL (ref 1.8–2.4)
MCH RBC QN AUTO: 29.1 PG (ref 25.7–33.7)
MCHC RBC AUTO-ENTMCNC: 32.1 G/DL (ref 32–35.9)
MCV RBC: 90.6 FL (ref 80–96)
MONOCYTES # BLD AUTO: 16.5 % (ref 3.8–10.2)
NEUTROPHILS # BLD: 60.8 % (ref 42.8–82.8)
PHOSPHATE SERPL-MCNC: 1.6 MG/DL (ref 2.5–4.9)
PLATELET # BLD AUTO: 125 10^3/UL (ref 134–434)
PMV BLD: 8.8 FL (ref 7.5–11.1)
POTASSIUM SERPLBLD-SCNC: 3.6 MMOL/L (ref 3.5–5.1)
PROT SERPL-MCNC: 6.3 G/DL (ref 6.4–8.2)
RBC # BLD AUTO: 3.04 M/MM3 (ref 4–5.6)
SODIUM SERPL-SCNC: 142 MMOL/L (ref 136–145)
WBC # BLD AUTO: 3.5 K/MM3 (ref 4–10)

## 2024-12-10 RX ADMIN — TAMSULOSIN HYDROCHLORIDE SCH MG: 0.4 CAPSULE ORAL at 10:51

## 2024-12-10 RX ADMIN — FINASTERIDE SCH MG: 5 TABLET, FILM COATED ORAL at 10:50

## 2024-12-10 RX ADMIN — MAGNESIUM SULFATE HEPTAHYDRATE ONE GM: 40 INJECTION, SOLUTION INTRAVENOUS at 13:40

## 2024-12-10 RX ADMIN — MAGNESIUM OXIDE TAB 400 MG (241.3 MG ELEMENTAL MG) ONE: 400 (241.3 MG) TAB at 15:59

## 2024-12-10 RX ADMIN — POTASSIUM & SODIUM PHOSPHATES POWDER PACK 280-160-250 MG ONE PACKET: 280-160-250 PACK at 13:40

## 2024-12-11 LAB
ALBUMIN SERPL-MCNC: 1.9 G/DL (ref 3.4–5)
ALP SERPL-CCNC: 101 U/L (ref 45–117)
ALT SERPL-CCNC: 34 U/L (ref 13–61)
ANION GAP SERPL CALC-SCNC: 7 MMOL/L (ref 4–13)
AST SERPL-CCNC: 70 U/L (ref 15–37)
BASOPHILS # BLD: 0.2 % (ref 0–2)
BILIRUB SERPL-MCNC: 0.4 MG/DL (ref 0.2–1)
BUN SERPL-MCNC: 9.4 MG/DL (ref 7–18)
CALCIUM SERPL-MCNC: 7.7 MG/DL (ref 8.5–10.1)
CHLORIDE SERPL-SCNC: 112 MMOL/L (ref 98–107)
CO2 SERPL-SCNC: 22 MMOL/L (ref 21–32)
CREAT SERPL-MCNC: 1.1 MG/DL (ref 0.55–1.3)
DEPRECATED RDW RBC AUTO: 15.7 % (ref 11.9–15.9)
EOSINOPHIL # BLD: 0.5 % (ref 0–4.5)
GLUCOSE SERPL-MCNC: 121 MG/DL (ref 74–106)
HCT VFR BLD CALC: 25.8 % (ref 35.4–49)
HGB BLD-MCNC: 8.4 GM/DL (ref 11.7–16.9)
LYMPHOCYTES # BLD: 14.7 % (ref 8–40)
MAGNESIUM SERPL-MCNC: 1.8 MG/DL (ref 1.8–2.4)
MCH RBC QN AUTO: 29.7 PG (ref 25.7–33.7)
MCHC RBC AUTO-ENTMCNC: 32.7 G/DL (ref 32–35.9)
MCV RBC: 90.7 FL (ref 80–96)
MONOCYTES # BLD AUTO: 16.8 % (ref 3.8–10.2)
NEUTROPHILS # BLD: 67.8 % (ref 42.8–82.8)
PHOSPHATE SERPL-MCNC: 1.6 MG/DL (ref 2.5–4.9)
PLATELET # BLD AUTO: 118 10^3/UL (ref 134–434)
PMV BLD: 8.8 FL (ref 7.5–11.1)
POTASSIUM SERPLBLD-SCNC: 3.4 MMOL/L (ref 3.5–5.1)
PROT SERPL-MCNC: 5.7 G/DL (ref 6.4–8.2)
RBC # BLD AUTO: 2.84 M/MM3 (ref 4–5.6)
SODIUM SERPL-SCNC: 141 MMOL/L (ref 136–145)
WBC # BLD AUTO: 3.7 K/MM3 (ref 4–10)

## 2024-12-11 RX ADMIN — POTASSIUM & SODIUM PHOSPHATES POWDER PACK 280-160-250 MG SCH PACKET: 280-160-250 PACK at 12:02

## 2024-12-11 RX ADMIN — MORPHINE SULFATE PRN MG: 30 TABLET ORAL at 20:29

## 2024-12-11 RX ADMIN — ACETAMINOPHEN ONE MG: 10 INJECTION, SOLUTION INTRAVENOUS at 04:58

## 2024-12-11 RX ADMIN — MORPHINE SULFATE SCH: 15 TABLET, EXTENDED RELEASE ORAL at 17:16

## 2024-12-11 RX ADMIN — SODIUM CHLORIDE SCH MLS/HR: 9 INJECTION, SOLUTION INTRAVENOUS at 12:02

## 2024-12-12 LAB
ALBUMIN SERPL-MCNC: 1.9 G/DL (ref 3.4–5)
ALP SERPL-CCNC: 95 U/L (ref 45–117)
ALT SERPL-CCNC: 29 U/L (ref 13–61)
ANION GAP SERPL CALC-SCNC: 6 MMOL/L (ref 4–13)
AST SERPL-CCNC: 50 U/L (ref 15–37)
BASOPHILS # BLD: 0.3 % (ref 0–2)
BILIRUB SERPL-MCNC: 0.3 MG/DL (ref 0.2–1)
BUN SERPL-MCNC: 9.2 MG/DL (ref 7–18)
CALCIUM SERPL-MCNC: 7.7 MG/DL (ref 8.5–10.1)
CHLORIDE SERPL-SCNC: 111 MMOL/L (ref 98–107)
CO2 SERPL-SCNC: 23 MMOL/L (ref 21–32)
CREAT SERPL-MCNC: 1.1 MG/DL (ref 0.55–1.3)
DEPRECATED RDW RBC AUTO: 16.1 % (ref 11.9–15.9)
EOSINOPHIL # BLD: 1 % (ref 0–4.5)
GLUCOSE SERPL-MCNC: 90 MG/DL (ref 74–106)
HCT VFR BLD CALC: 24.7 % (ref 35.4–49)
HGB BLD-MCNC: 8.2 GM/DL (ref 11.7–16.9)
LYMPHOCYTES # BLD: 23.2 % (ref 8–40)
MAGNESIUM SERPL-MCNC: 1.5 MG/DL (ref 1.8–2.4)
MCH RBC QN AUTO: 29.2 PG (ref 25.7–33.7)
MCHC RBC AUTO-ENTMCNC: 33 G/DL (ref 32–35.9)
MCV RBC: 88.7 FL (ref 80–96)
MONOCYTES # BLD AUTO: 16.8 % (ref 3.8–10.2)
NEUTROPHILS # BLD: 58.7 % (ref 42.8–82.8)
PHOSPHATE SERPL-MCNC: 2 MG/DL (ref 2.5–4.9)
PLATELET # BLD AUTO: 116 10^3/UL (ref 134–434)
PMV BLD: 9 FL (ref 7.5–11.1)
POTASSIUM SERPLBLD-SCNC: 3.5 MMOL/L (ref 3.5–5.1)
PROT SERPL-MCNC: 5.7 G/DL (ref 6.4–8.2)
RBC # BLD AUTO: 2.79 M/MM3 (ref 4–5.6)
SODIUM SERPL-SCNC: 140 MMOL/L (ref 136–145)
WBC # BLD AUTO: 2.7 K/MM3 (ref 4–10)

## 2024-12-12 RX ADMIN — TAMSULOSIN HYDROCHLORIDE SCH MG: 0.4 CAPSULE ORAL at 21:17

## 2024-12-12 RX ADMIN — HEPARIN SODIUM SCH UNIT: 5000 INJECTION, SOLUTION INTRAVENOUS; SUBCUTANEOUS at 14:14

## 2024-12-12 RX ADMIN — SUCRALFATE SCH GM: 1 SUSPENSION ORAL at 21:16

## 2024-12-12 RX ADMIN — MAGNESIUM SULFATE HEPTAHYDRATE ONE GM: 40 INJECTION, SOLUTION INTRAVENOUS at 17:25

## 2024-12-12 RX ADMIN — MAGNESIUM SULFATE HEPTAHYDRATE ONE GM: 500 INJECTION, SOLUTION INTRAMUSCULAR; INTRAVENOUS at 16:27

## 2024-12-12 RX ADMIN — POTASSIUM PHOSPHATE, MONOBASIC AND POTASSIUM PHOSPHATE, DIBASIC ONE MLS/HR: 224; 236 INJECTION, SOLUTION, CONCENTRATE INTRAVENOUS at 18:07

## 2024-12-12 RX ADMIN — AZTREONAM SCH MLS/HR: 1 INJECTION, POWDER, LYOPHILIZED, FOR SOLUTION INTRAMUSCULAR; INTRAVENOUS at 17:29

## 2024-12-13 VITALS
SYSTOLIC BLOOD PRESSURE: 107 MMHG | TEMPERATURE: 98.2 F | HEART RATE: 77 BPM | DIASTOLIC BLOOD PRESSURE: 62 MMHG | RESPIRATION RATE: 17 BRPM

## 2024-12-13 LAB
ALBUMIN SERPL-MCNC: 2.1 G/DL (ref 3.4–5)
ALP SERPL-CCNC: 100 U/L (ref 45–117)
ALT SERPL-CCNC: 26 U/L (ref 13–61)
ANION GAP SERPL CALC-SCNC: 6 MMOL/L (ref 4–13)
AST SERPL-CCNC: 47 U/L (ref 15–37)
BASOPHILS # BLD: 0.3 % (ref 0–2)
BILIRUB SERPL-MCNC: 0.3 MG/DL (ref 0.2–1)
BUN SERPL-MCNC: 6.9 MG/DL (ref 7–18)
CALCIUM SERPL-MCNC: 7.8 MG/DL (ref 8.5–10.1)
CHLORIDE SERPL-SCNC: 107 MMOL/L (ref 98–107)
CO2 SERPL-SCNC: 24 MMOL/L (ref 21–32)
CREAT SERPL-MCNC: 1.1 MG/DL (ref 0.55–1.3)
DEPRECATED RDW RBC AUTO: 16.3 % (ref 11.9–15.9)
EOSINOPHIL # BLD: 1 % (ref 0–4.5)
GLUCOSE SERPL-MCNC: 103 MG/DL (ref 74–106)
HCT VFR BLD CALC: 26.6 % (ref 35.4–49)
HGB BLD-MCNC: 8.7 GM/DL (ref 11.7–16.9)
LYMPHOCYTES # BLD: 18.2 % (ref 8–40)
MAGNESIUM SERPL-MCNC: 2 MG/DL (ref 1.8–2.4)
MCH RBC QN AUTO: 29.3 PG (ref 25.7–33.7)
MCHC RBC AUTO-ENTMCNC: 32.7 G/DL (ref 32–35.9)
MCV RBC: 89.6 FL (ref 80–96)
MONOCYTES # BLD AUTO: 16.6 % (ref 3.8–10.2)
NEUTROPHILS # BLD: 63.9 % (ref 42.8–82.8)
PHOSPHATE SERPL-MCNC: 2.8 MG/DL (ref 2.5–4.9)
PLATELET # BLD AUTO: 120 10^3/UL (ref 134–434)
PMV BLD: 8.9 FL (ref 7.5–11.1)
POTASSIUM SERPLBLD-SCNC: 3.7 MMOL/L (ref 3.5–5.1)
PROT SERPL-MCNC: 6.1 G/DL (ref 6.4–8.2)
RBC # BLD AUTO: 2.97 M/MM3 (ref 4–5.6)
SODIUM SERPL-SCNC: 137 MMOL/L (ref 136–145)
WBC # BLD AUTO: 3.1 K/MM3 (ref 4–10)

## 2024-12-13 RX ADMIN — PANTOPRAZOLE SODIUM SCH MG: 40 TABLET, DELAYED RELEASE ORAL at 10:32

## 2024-12-13 RX ADMIN — FINASTERIDE SCH MG: 5 TABLET, FILM COATED ORAL at 10:28
